# Patient Record
Sex: MALE | Race: WHITE | Employment: FULL TIME | URBAN - METROPOLITAN AREA
[De-identification: names, ages, dates, MRNs, and addresses within clinical notes are randomized per-mention and may not be internally consistent; named-entity substitution may affect disease eponyms.]

---

## 2017-01-20 ENCOUNTER — OFFICE VISIT (OUTPATIENT)
Dept: FAMILY MEDICINE CLINIC | Age: 42
End: 2017-01-20

## 2017-01-20 VITALS
DIASTOLIC BLOOD PRESSURE: 74 MMHG | HEART RATE: 68 BPM | TEMPERATURE: 98 F | RESPIRATION RATE: 16 BRPM | HEIGHT: 75 IN | SYSTOLIC BLOOD PRESSURE: 112 MMHG | WEIGHT: 276 LBS | BODY MASS INDEX: 34.32 KG/M2

## 2017-01-20 DIAGNOSIS — J01.01 ACUTE RECURRENT MAXILLARY SINUSITIS: Primary | ICD-10-CM

## 2017-01-20 DIAGNOSIS — Z80.0 FH: COLON CANCER: ICD-10-CM

## 2017-01-20 PROCEDURE — 99213 OFFICE O/P EST LOW 20 MIN: CPT | Performed by: INTERNAL MEDICINE

## 2017-01-20 RX ORDER — AZITHROMYCIN 250 MG/1
TABLET, FILM COATED ORAL
Qty: 1 PACKET | Refills: 0 | Status: SHIPPED | OUTPATIENT
Start: 2017-01-20 | End: 2017-01-25

## 2017-01-20 ASSESSMENT — ENCOUNTER SYMPTOMS
SHORTNESS OF BREATH: 0
DIARRHEA: 0
WHEEZING: 0
ABDOMINAL PAIN: 0

## 2017-10-20 ENCOUNTER — OFFICE VISIT (OUTPATIENT)
Dept: FAMILY MEDICINE CLINIC | Age: 42
End: 2017-10-20

## 2017-10-20 VITALS
DIASTOLIC BLOOD PRESSURE: 88 MMHG | RESPIRATION RATE: 16 BRPM | SYSTOLIC BLOOD PRESSURE: 138 MMHG | WEIGHT: 279 LBS | HEIGHT: 75 IN | BODY MASS INDEX: 34.69 KG/M2 | TEMPERATURE: 98 F | OXYGEN SATURATION: 98 % | HEART RATE: 64 BPM

## 2017-10-20 DIAGNOSIS — B08.4 HAND, FOOT AND MOUTH DISEASE: Primary | ICD-10-CM

## 2017-10-20 PROCEDURE — 99212 OFFICE O/P EST SF 10 MIN: CPT | Performed by: INTERNAL MEDICINE

## 2017-10-20 RX ORDER — AMOXICILLIN 500 MG/1
CAPSULE ORAL
Refills: 2 | COMMUNITY
Start: 2017-10-17 | End: 2018-06-19 | Stop reason: ALTCHOICE

## 2017-10-20 ASSESSMENT — PATIENT HEALTH QUESTIONNAIRE - PHQ9
2. FEELING DOWN, DEPRESSED OR HOPELESS: 0
SUM OF ALL RESPONSES TO PHQ9 QUESTIONS 1 & 2: 0
1. LITTLE INTEREST OR PLEASURE IN DOING THINGS: 0
SUM OF ALL RESPONSES TO PHQ QUESTIONS 1-9: 0

## 2017-10-20 ASSESSMENT — ENCOUNTER SYMPTOMS
WHEEZING: 0
CONSTIPATION: 0
SHORTNESS OF BREATH: 0
DIARRHEA: 0

## 2017-10-24 ENCOUNTER — TELEPHONE (OUTPATIENT)
Dept: FAMILY MEDICINE CLINIC | Age: 42
End: 2017-10-24

## 2018-06-19 ENCOUNTER — OFFICE VISIT (OUTPATIENT)
Dept: FAMILY MEDICINE CLINIC | Age: 43
End: 2018-06-19

## 2018-06-19 VITALS
RESPIRATION RATE: 14 BRPM | WEIGHT: 283 LBS | BODY MASS INDEX: 35.19 KG/M2 | SYSTOLIC BLOOD PRESSURE: 118 MMHG | HEIGHT: 75 IN | HEART RATE: 88 BPM | DIASTOLIC BLOOD PRESSURE: 80 MMHG | OXYGEN SATURATION: 98 %

## 2018-06-19 DIAGNOSIS — J01.90 ACUTE SINUSITIS, RECURRENCE NOT SPECIFIED, UNSPECIFIED LOCATION: Primary | ICD-10-CM

## 2018-06-19 PROCEDURE — 99213 OFFICE O/P EST LOW 20 MIN: CPT | Performed by: INTERNAL MEDICINE

## 2018-06-19 RX ORDER — AMOXICILLIN AND CLAVULANATE POTASSIUM 875; 125 MG/1; MG/1
1 TABLET, FILM COATED ORAL 2 TIMES DAILY
Qty: 20 TABLET | Refills: 0 | Status: SHIPPED | OUTPATIENT
Start: 2018-06-19 | End: 2018-06-29

## 2018-06-19 RX ORDER — PREDNISONE 10 MG/1
TABLET ORAL
Qty: 32 TABLET | Refills: 0 | Status: SHIPPED | OUTPATIENT
Start: 2018-06-19 | End: 2019-01-28 | Stop reason: ALTCHOICE

## 2018-06-19 ASSESSMENT — ENCOUNTER SYMPTOMS
SHORTNESS OF BREATH: 1
NAUSEA: 0
WHEEZING: 0
COUGH: 1
VOMITING: 0

## 2018-06-19 ASSESSMENT — PATIENT HEALTH QUESTIONNAIRE - PHQ9
SUM OF ALL RESPONSES TO PHQ QUESTIONS 1-9: 0
1. LITTLE INTEREST OR PLEASURE IN DOING THINGS: 0
2. FEELING DOWN, DEPRESSED OR HOPELESS: 0
SUM OF ALL RESPONSES TO PHQ9 QUESTIONS 1 & 2: 0

## 2018-09-24 ENCOUNTER — E-VISIT (OUTPATIENT)
Dept: FAMILY MEDICINE CLINIC | Age: 43
End: 2018-09-24

## 2018-09-24 ENCOUNTER — PATIENT MESSAGE (OUTPATIENT)
Dept: FAMILY MEDICINE CLINIC | Age: 43
End: 2018-09-24

## 2018-09-24 PROCEDURE — 99444 PR PHYSICIAN ONLINE EVALUATION & MANAGEMENT SERVICE: CPT | Performed by: INTERNAL MEDICINE

## 2018-09-24 RX ORDER — AMOXICILLIN AND CLAVULANATE POTASSIUM 875; 125 MG/1; MG/1
1 TABLET, FILM COATED ORAL 2 TIMES DAILY
Qty: 20 TABLET | Refills: 0 | Status: SHIPPED | OUTPATIENT
Start: 2018-09-24 | End: 2018-10-04

## 2018-09-24 ASSESSMENT — LIFESTYLE VARIABLES
HISTORY_OF_SMOKING: I SMOKED IN THE PAST, BUT NOT REGULARY
SMOKING_STATUS: SOMETIMES

## 2018-09-24 NOTE — TELEPHONE ENCOUNTER
From: Levi Butler  To: Danny Cole MD  Sent: 9/24/2018 12:47 PM EDT  Subject: Prescription Question    I am writing to see if you could call me in a presciption for an antibiotic for a Sinus Infection. I was in to see you a couple months ago for same thing. I have having bad sinus pressure and a neon greenish yellowish mucus. When I called they said you would want me to come in, but I will be traveling in and out of town for the next few days, will only be in town in the evenings. I was hoping since you saw me not that long ago you would be able to call in a prescription for me. I get extremely stuffed at night and have some sinus pressure on the left side of my face. No fever or coughing, no other symptoms. Would like to knock this out before gets worse.     Please let me know, call me if necessary [phone]    Thank you,    Chip

## 2019-01-28 ENCOUNTER — OFFICE VISIT (OUTPATIENT)
Dept: FAMILY MEDICINE CLINIC | Age: 44
End: 2019-01-28
Payer: COMMERCIAL

## 2019-01-28 VITALS
DIASTOLIC BLOOD PRESSURE: 84 MMHG | BODY MASS INDEX: 36.18 KG/M2 | WEIGHT: 291 LBS | HEART RATE: 70 BPM | SYSTOLIC BLOOD PRESSURE: 134 MMHG | OXYGEN SATURATION: 98 % | HEIGHT: 75 IN | RESPIRATION RATE: 14 BRPM | TEMPERATURE: 98.1 F

## 2019-01-28 DIAGNOSIS — J01.91 ACUTE RECURRENT SINUSITIS, UNSPECIFIED LOCATION: Primary | ICD-10-CM

## 2019-01-28 DIAGNOSIS — Z12.11 SCREENING FOR COLON CANCER: ICD-10-CM

## 2019-01-28 PROCEDURE — 99213 OFFICE O/P EST LOW 20 MIN: CPT | Performed by: INTERNAL MEDICINE

## 2019-01-28 RX ORDER — AMOXICILLIN AND CLAVULANATE POTASSIUM 875; 125 MG/1; MG/1
1 TABLET, FILM COATED ORAL 2 TIMES DAILY
Qty: 20 TABLET | Refills: 0 | Status: SHIPPED | OUTPATIENT
Start: 2019-01-28 | End: 2019-02-07

## 2019-01-28 RX ORDER — PREDNISONE 10 MG/1
TABLET ORAL
Qty: 32 TABLET | Refills: 0 | Status: SHIPPED | OUTPATIENT
Start: 2019-01-28 | End: 2019-07-31

## 2019-01-28 ASSESSMENT — ENCOUNTER SYMPTOMS
NAUSEA: 0
COUGH: 1
VOMITING: 0

## 2019-01-30 NOTE — PROGRESS NOTES
10/20/2017    This is a 38 y.o. male   Chief Complaint   Patient presents with    Pharyngitis   . 2 days ago felt like something was stuck in the throat. Has white sore on the tonsil  Reminded him of when he had hand , foot , mouth dx. Maybe  1 -1 1/2 yrs    Son just had it a week ago. No rashes . Only has one lesion in the mouth  No trouble swollowing   No fever    HPI    Review of Systems   Constitutional: Negative for appetite change and chills. Respiratory: Negative for shortness of breath and wheezing. Gastrointestinal: Negative for constipation and diarrhea. Musculoskeletal: Positive for arthralgias (chronic hip  both ) and myalgias. Past Medical History:   Diagnosis Date    Avascular necrosis ([address]     BOTH HIPS    DJD (degenerative joint disease)     Fracture of ulna, left, closed     LEFT 1992 AND RIGHT ULNAR 1995    Fracture, tibia 2004    Sinus headache        Prior to Visit Medications    Medication Sig Taking?  Authorizing Provider   amoxicillin (AMOXIL) 500 MG capsule DNC  Historical Provider, MD   albuterol sulfate  (90 BASE) MCG/ACT inhaler Inhale 2 puffs into the lungs every 4 hours as needed for Wheezing May substitute for insurance preferred (Ventolin, Proventil, ProAir)  Greta Bingham MD       Past Surgical History:   Procedure Laterality Date    OTHER SURGICAL HISTORY      L HIP ASEPTIC NECROSIS    SINUS SURGERY  06/29/2016    FESS    TOTAL HIP ARTHROPLASTY  06/2009    RIGHT     TOTAL HIP ARTHROPLASTY Left     WISDOM TOOTH EXTRACTION         Social History     Social History    Marital status:      Spouse name: Teena De Jesus Number of children: 1    Years of education: N/A     Occupational History    [employer] for building material       [employer]     Social History Main Topics    Smoking status: Former Smoker     Years: 16.00     Types: Cigarettes    Smokeless tobacco: Never Used      Comment: counseled on tobacco exposure avoidance  smokes 1/2 cigarette day    Alcohol use 0.0 oz/week      Comment: socially    Drug use: No    Sexual activity: Not on file     Other Topics Concern    Not on file     Social History Narrative    No narrative on file       Family History   Problem Relation Age of Onset    High Blood Pressure Mother     Cancer Father 59     COLON THAT MEASTASIZED TO LIVER/ LUNG        Current Outpatient Prescriptions   Medication Sig Dispense Refill    amoxicillin (AMOXIL) 500 MG capsule DNC  2    albuterol sulfate  (90 BASE) MCG/ACT inhaler Inhale 2 puffs into the lungs every 4 hours as needed for Wheezing May substitute for insurance preferred (Ventolin, Proventil, ProAir) 1 Inhaler 0     No current facility-administered medications for this visit. No Known Allergies    /88 (Site: Left Arm, Position: Sitting, Cuff Size: Large Adult)   Pulse 64   Temp 98 °F (36.7 °C) (Oral)   Resp 16   Ht 6' 3\" (1.905 m)   Wt 279 lb (126.6 kg)   SpO2 98%   BMI 34.87 kg/m²     Physical Exam   Constitutional: He appears well-developed and well-nourished. HENT:   Head: Normocephalic and atraumatic. Right Ear: External ear normal.   Left Ear: External ear normal.   Has a 5mm ulceration on the right tonsils  No other mouth lesions   Eyes: Conjunctivae are normal. No scleral icterus. Neck: Neck supple. No thyromegaly present. Cardiovascular: Normal rate and regular rhythm. No murmur heard. Pulmonary/Chest: Breath sounds normal. No respiratory distress. He has no wheezes. Abdominal: Soft. He exhibits no distension. There is no tenderness. Musculoskeletal: He exhibits no edema or deformity. Neurological: He is alert. He exhibits normal muscle tone. Skin: Skin is warm and dry. No rash noted. Psychiatric: He has a normal mood and affect.  His behavior is normal. Judgment and thought content normal.       Wt Readings from Last 3 Encounters:   10/20/17 279 lb (126.6 kg)   01/20/17 276 lb (125.2 kg)   12/06/16 [phone]

## 2019-07-03 ENCOUNTER — OFFICE VISIT (OUTPATIENT)
Dept: FAMILY MEDICINE CLINIC | Age: 44
End: 2019-07-03
Payer: COMMERCIAL

## 2019-07-03 VITALS
SYSTOLIC BLOOD PRESSURE: 128 MMHG | WEIGHT: 280 LBS | BODY MASS INDEX: 34.82 KG/M2 | HEIGHT: 75 IN | OXYGEN SATURATION: 98 % | HEART RATE: 95 BPM | DIASTOLIC BLOOD PRESSURE: 88 MMHG

## 2019-07-03 DIAGNOSIS — Z12.83 SCREENING EXAM FOR SKIN CANCER: ICD-10-CM

## 2019-07-03 DIAGNOSIS — L30.1 DYSHIDROTIC ECZEMA: Primary | ICD-10-CM

## 2019-07-03 DIAGNOSIS — R03.0 ELEVATED BP WITHOUT DIAGNOSIS OF HYPERTENSION: ICD-10-CM

## 2019-07-03 PROCEDURE — 99213 OFFICE O/P EST LOW 20 MIN: CPT | Performed by: INTERNAL MEDICINE

## 2019-07-03 RX ORDER — PREDNISONE 10 MG/1
TABLET ORAL
Qty: 49 TABLET | Refills: 0 | Status: SHIPPED | OUTPATIENT
Start: 2019-07-03 | End: 2019-07-31

## 2019-07-03 ASSESSMENT — PATIENT HEALTH QUESTIONNAIRE - PHQ9
2. FEELING DOWN, DEPRESSED OR HOPELESS: 0
SUM OF ALL RESPONSES TO PHQ QUESTIONS 1-9: 0
1. LITTLE INTEREST OR PLEASURE IN DOING THINGS: 0
SUM OF ALL RESPONSES TO PHQ QUESTIONS 1-9: 0
SUM OF ALL RESPONSES TO PHQ9 QUESTIONS 1 & 2: 0

## 2019-07-03 ASSESSMENT — ENCOUNTER SYMPTOMS: ROS SKIN COMMENTS: ITCHY

## 2019-07-31 ENCOUNTER — OFFICE VISIT (OUTPATIENT)
Dept: FAMILY MEDICINE CLINIC | Age: 44
End: 2019-07-31
Payer: COMMERCIAL

## 2019-07-31 VITALS
DIASTOLIC BLOOD PRESSURE: 80 MMHG | RESPIRATION RATE: 15 BRPM | WEIGHT: 281 LBS | BODY MASS INDEX: 34.94 KG/M2 | TEMPERATURE: 97.7 F | HEIGHT: 75 IN | HEART RATE: 66 BPM | SYSTOLIC BLOOD PRESSURE: 128 MMHG

## 2019-07-31 DIAGNOSIS — Z12.11 SCREENING FOR COLON CANCER: ICD-10-CM

## 2019-07-31 DIAGNOSIS — Z80.0 FH: COLON CANCER: ICD-10-CM

## 2019-07-31 DIAGNOSIS — L30.1 DYSHIDROTIC ECZEMA: Primary | ICD-10-CM

## 2019-07-31 DIAGNOSIS — Z00.00 LABORATORY TESTS ORDERED AS PART OF A COMPLETE PHYSICAL EXAM (CPE): ICD-10-CM

## 2019-07-31 PROCEDURE — 99213 OFFICE O/P EST LOW 20 MIN: CPT | Performed by: INTERNAL MEDICINE

## 2019-07-31 ASSESSMENT — ENCOUNTER SYMPTOMS
ROS SKIN COMMENTS: NO ITCHING
EYE REDNESS: 0
DIARRHEA: 0
CONSTIPATION: 0
COLOR CHANGE: 0
BLOOD IN STOOL: 0
EYE ITCHING: 0

## 2019-07-31 NOTE — PROGRESS NOTES
7/31/2019    Chief Complaint   Patient presents with    Other     Pt is here for a 3 wk f/u      Saw dermatology and is being treated for dishydrodic eczema with cream now  Finished prednisone  Hand rash resolved  Feet are getting better  Saw derm last week     Needs colonoscopy . Father had colon cancer    Not sure when diagnosed. HPI    Review of Systems   Constitutional: Negative for appetite change and unexpected weight change. Eyes: Negative for redness and itching. Gastrointestinal: Negative for blood in stool, constipation and diarrhea. Skin: Negative for color change. 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      Comment: [employer]   Social Needs    Financial resource strain: None    Food insecurity:     Worry: None     Inability: None    Transportation needs:     Medical: None     Non-medical: None   Tobacco Use    Smoking status: Former Smoker     Years: 16.00     Types: Cigarettes    Smokeless tobacco: Never Used    Tobacco comment: counseled on tobacco exposure avoidance  smokes 1/2 cigarette day   Substance and Sexual Activity    Alcohol use:  Yes     Alcohol/week: 0.0 standard drinks     Comment: socially    Drug use: No    Sexual activity: None   Lifestyle    Physical activity:     Days per week: None     Minutes per session: None    Stress: None   Relationships    Social connections:     Talks on phone: None     Gets together: None     Attends [employer] service: None     Active member

## 2020-11-21 ENCOUNTER — NURSE TRIAGE (OUTPATIENT)
Dept: OTHER | Facility: CLINIC | Age: 45
End: 2020-11-21

## 2020-11-21 NOTE — TELEPHONE ENCOUNTER
Reason for Disposition   [1] COVID-19 infection suspected by caller or triager AND [2] mild symptoms (cough, fever, or others) AND [2] no complications or SOB    Answer Assessment - Initial Assessment Questions  1. COVID-19 DIAGNOSIS: \"Who made your Coronavirus (COVID-19) diagnosis? \" \"Was it confirmed by a positive lab test?\" If not diagnosed by a HCP, ask \"Are there lots of cases (community spread) where you live? \" (See [facility] health department website, if unsure)      Na    2. ONSET: \"When did the COVID-19 symptoms start? \"       Cough started on wed. Loss of smell this morning     3. WORST SYMPTOM: \"What is your worst symptom? \" (e.g., cough, fever, shortness of breath, muscle aches)      Loss of smell     4. COUGH: \"Do you have a cough? \" If so, ask: \"How bad is the cough? \"        Cough comes and goes in morning mostly taking mucinex to help does get worse in evening and takes OTC meds     5. FEVER: \"Do you have a fever? \" If so, ask: \"What is your temperature, how was it measured, and when did it start? \"      No     6. RESPIRATORY STATUS: \"Describe your breathing? \" (e.g., shortness of breath, wheezing, unable to speak)       No     7. BETTER-SAME-WORSE: Merla Soho you getting better, staying the same or getting worse compared to yesterday? \"  If getting worse, ask, \"In what way? \"      Worse    8. HIGH RISK DISEASE: \"Do you have any chronic medical problems? \" (e.g., asthma, heart or lung disease, weak immune system, etc.)      No     9. PREGNANCY: \"Is there any chance you are pregnant? \" \"When was your last menstrual period? \"      Na    10. OTHER SYMPTOMS: \"Do you have any other symptoms? \"  (e.g., chills, fatigue, headache, loss of smell or taste, muscle pain, sore throat)        Headache possibly sinus    Protocols used: CORONAVIRUS (COVID-19) DIAGNOSED OR SUSPECTED-ADULT-    Patient called pre-service [facility] with red flag complaint.      Brief description of triage: covid / flu like symptoms

## 2020-11-23 NOTE — TELEPHONE ENCOUNTER
[phone] (home)   Called pt and he did get tested at an urgent care. Has not gotten results back. Pt says that he is still unable to smell. Has a slight cough. But doesn't feel too bad. Will let us know how the results come back.

## 2020-12-01 ENCOUNTER — VIRTUAL VISIT (OUTPATIENT)
Dept: FAMILY MEDICINE CLINIC | Age: 45
End: 2020-12-01
Payer: COMMERCIAL

## 2020-12-01 PROCEDURE — 99213 OFFICE O/P EST LOW 20 MIN: CPT | Performed by: NURSE PRACTITIONER

## 2020-12-01 RX ORDER — AMOXICILLIN AND CLAVULANATE POTASSIUM 875; 125 MG/1; MG/1
1 TABLET, FILM COATED ORAL 2 TIMES DAILY
Qty: 20 TABLET | Refills: 0 | Status: SHIPPED | OUTPATIENT
Start: 2020-12-01 | End: 2020-12-11

## 2020-12-01 ASSESSMENT — PATIENT HEALTH QUESTIONNAIRE - PHQ9
2. FEELING DOWN, DEPRESSED OR HOPELESS: 0
SUM OF ALL RESPONSES TO PHQ9 QUESTIONS 1 & 2: 0
SUM OF ALL RESPONSES TO PHQ QUESTIONS 1-9: 0
1. LITTLE INTEREST OR PLEASURE IN DOING THINGS: 0

## 2020-12-01 ASSESSMENT — ENCOUNTER SYMPTOMS
RHINORRHEA: 1
ABDOMINAL PAIN: 0
COUGH: 1
SINUS PAIN: 1
SORE THROAT: 0
SHORTNESS OF BREATH: 1
SINUS PRESSURE: 1
WHEEZING: 0

## 2020-12-01 NOTE — PROGRESS NOTES
2020    TELEHEALTH EVALUATION -- Audio/Visual (During TYQYX-02 public health emergency)    HPI:    Kuldip Fowler (:  1975) has requested an audio/video evaluation for the following concern(s):  Chief Complaint   Patient presents with    Cough     loss of smell and tested positive for covid  and results come back on .  Congestion     Started with cough on 20. Went to Urgent care on 20 and had COVID positive testing. Reports that he continues with a mild cough mostly in the AM and PM. Denies shortness of breath. Sinus drainage is thick yellow and green. Positive for sinus pressure and congestion. Feels that sinus symptoms are getting worse. Been using OTC sinus nasal spray without improvement in symptoms. Denies fever. Did lose sense of smell, but that has now returned. Has history of sinusitis. Review of Systems   Constitutional: Positive for activity change and fatigue. Negative for fever. HENT: Positive for congestion, postnasal drip, rhinorrhea, sinus pressure and sinus pain. Negative for sore throat. Respiratory: Positive for cough and shortness of breath. Negative for wheezing. Cardiovascular: Negative for chest pain, palpitations and leg swelling. Gastrointestinal: Negative for abdominal pain. Neurological: Negative for dizziness and headaches. Prior to Visit Medications    Not on File       Social History     Tobacco Use    Smoking status: Former Smoker     Years: 16.00     Types: Cigarettes    Smokeless tobacco: Never Used    Tobacco comment: counseled on tobacco exposure avoidance  smokes 1/2 cigarette day   Substance Use Topics    Alcohol use:  Yes     Alcohol/week: 0.0 standard drinks     Comment: socially    Drug use: No        PHYSICAL EXAMINATION:  [ INSTRUCTIONS:  \"[x]\" Indicates a positive item  \"[]\" Indicates a negative item  -- DELETE ALL ITEMS NOT EXAMINED]  Patient-Reported Vitals 2020   Patient-Reported Weight 275lb   Patient-Reported Height 6'4\"          Constitutional: [x] Appears well-developed and well-nourished [x] No apparent distress      [] Abnormal-   Mental status  [x] Alert and awake  [x] Oriented to person/place/time [x]Able to follow commands      Eyes:  EOM    [x]  Normal  [] Abnormal-  Sclera  [x]  Normal  [] Abnormal -         Discharge [x]  None visible  [] Abnormal -    HENT:   [x] Normocephalic, atraumatic. [] Abnormal   [x] Mouth/Throat: Mucous membranes are moist.     External Ears [x] Normal  [] Abnormal-     Neck: [x] No visualized mass     Pulmonary/Chest: [x] Respiratory effort normal.  [x] No visualized signs of difficulty breathing or respiratory distress        [] Abnormal-      Musculoskeletal:   [] Normal gait with no signs of ataxia         [x] Normal range of motion of neck        [] Abnormal-       Neurological:        [x] No Facial Asymmetry (Cranial nerve 7 motor function) (limited exam to video visit)          [x] No gaze palsy        [] Abnormal-         Skin:        [x] No significant exanthematous lesions or discoloration noted on facial skin         [] Abnormal-            Psychiatric:       [x] Normal Affect [x] No Hallucinations        [] Abnormal-     Other pertinent observable physical exam findings-     ASSESSMENT/PLAN:  1. COVID-19 virus infection  Symptoms developed 12/17/20. Now feeling better. He has been working from home and planning to return back to in person work next week. 2. Acute bacterial sinusitis  - amoxicillin-clavulanate (AUGMENTIN) 875-125 MG per tablet; Take 1 tablet by mouth 2 times daily for 10 days For treatment of bacterial infection. Take with meals. Dispense: 20 tablet; Refill: 0  Should take an over-the-counter probiotic daily while on the antibiotic to help prevent antibiotic associated diarrhea. Continue to stay hydrated with water. Normal saline spray PRN  Humidifier qhs.    Patient is to call if symptoms worsen or fail to improve within the week.     Return if symptoms worsen or fail to improve. Karen Ramos is a 39 y.o. male being evaluated by a Virtual Visit (video visit) encounter to address concerns as mentioned above. A caregiver was present when appropriate. Due to this being a TeleHealth encounter (During YQLEB-71 public health emergency), evaluation of the following organ systems was limited: Vitals/Constitutional/EENT/Resp/CV/GI//MS/Neuro/Skin/Heme-Lymph-Imm. Pursuant to the emergency declaration under the [address] [place] [address], [address] [place] [address] authority and the [employer] Act, this Virtual Visit was conducted with patient's (and/or legal guardian's) consent, to reduce the patient's risk of exposure to COVID-19 and provide necessary medical care. The patient (and/or legal guardian) has also been advised to contact this office for worsening conditions or problems, and seek emergency medical treatment and/or call 911 if deemed necessary. Patient identification was verified at the start of the visit: Yes    Total time spent on this encounter: Not billed by time    Services were provided through a video synchronous discussion virtually to substitute for in-person clinic visit. Patient and provider were located at their individual homes. --WHITNEY Genao CNP on 12/1/2020 at 9:00 AM    An electronic signature was used to authenticate this note.

## 2021-12-07 ENCOUNTER — E-VISIT (OUTPATIENT)
Dept: FAMILY MEDICINE CLINIC | Age: 46
End: 2021-12-07
Payer: COMMERCIAL

## 2021-12-07 DIAGNOSIS — B96.89 ACUTE BACTERIAL SINUSITIS: Primary | ICD-10-CM

## 2021-12-07 DIAGNOSIS — J01.90 ACUTE BACTERIAL SINUSITIS: Primary | ICD-10-CM

## 2021-12-07 PROCEDURE — 99421 OL DIG E/M SVC 5-10 MIN: CPT | Performed by: NURSE PRACTITIONER

## 2021-12-07 RX ORDER — AMOXICILLIN AND CLAVULANATE POTASSIUM 875; 125 MG/1; MG/1
1 TABLET, FILM COATED ORAL 2 TIMES DAILY
Qty: 20 TABLET | Refills: 0 | Status: SHIPPED | OUTPATIENT
Start: 2021-12-07 | End: 2021-12-17

## 2021-12-07 ASSESSMENT — LIFESTYLE VARIABLES
SMOKING_STATUS: YES
SMOKING_YEARS: 10

## 2021-12-07 NOTE — PROGRESS NOTES
Reviewed patient's responses. Will treat for sinusitis. See message to patient. 5-10 minutes were spent on the digital evaluation and management of this patient.

## 2022-01-04 ENCOUNTER — E-VISIT (OUTPATIENT)
Dept: FAMILY MEDICINE CLINIC | Age: 47
End: 2022-01-04
Payer: COMMERCIAL

## 2022-01-04 DIAGNOSIS — R09.81 SINUS CONGESTION: Primary | ICD-10-CM

## 2022-01-04 DIAGNOSIS — R51.9 ACUTE NONINTRACTABLE HEADACHE, UNSPECIFIED HEADACHE TYPE: ICD-10-CM

## 2022-01-04 PROCEDURE — 99421 OL DIG E/M SVC 5-10 MIN: CPT | Performed by: NURSE PRACTITIONER

## 2022-01-04 NOTE — PROGRESS NOTES
Reviewed patients responses. Will treat for sinus congestion. See message to patient. 5-10 minutes were spent on the digital evaluation and management of this patient.

## 2022-01-06 ENCOUNTER — TELEMEDICINE (OUTPATIENT)
Dept: FAMILY MEDICINE CLINIC | Age: 47
End: 2022-01-06
Payer: COMMERCIAL

## 2022-01-06 ENCOUNTER — TELEPHONE (OUTPATIENT)
Dept: FAMILY MEDICINE CLINIC | Age: 47
End: 2022-01-06

## 2022-01-06 DIAGNOSIS — B96.89 ACUTE BACTERIAL SINUSITIS: ICD-10-CM

## 2022-01-06 DIAGNOSIS — J01.90 ACUTE BACTERIAL SINUSITIS: ICD-10-CM

## 2022-01-06 DIAGNOSIS — R51.9 ACUTE NONINTRACTABLE HEADACHE, UNSPECIFIED HEADACHE TYPE: Primary | ICD-10-CM

## 2022-01-06 PROCEDURE — 99442 PR PHYS/QHP TELEPHONE EVALUATION 11-20 MIN: CPT | Performed by: FAMILY MEDICINE

## 2022-01-06 RX ORDER — CLARITHROMYCIN 500 MG/1
500 TABLET, COATED ORAL 2 TIMES DAILY
Qty: 20 TABLET | Refills: 0 | Status: SHIPPED | OUTPATIENT
Start: 2022-01-06 | End: 2022-01-16

## 2022-01-06 RX ORDER — PREDNISONE 10 MG/1
10 TABLET ORAL 2 TIMES DAILY
Qty: 10 TABLET | Refills: 0 | Status: SHIPPED | OUTPATIENT
Start: 2022-01-06 | End: 2022-01-11

## 2022-01-06 NOTE — PROGRESS NOTES
TELEHEALTH EVALUATION -- Audio/Visual (During [facility]-06 public health emergency)  VIDEO VISIT- patient and provider not at same location  Also present:none. PROBLEM VISIT NOTE     Subjective:     Chief Complaint   Patient presents with    Headache     Katelyn Martinez is a 42 y.o. male   who presents really bad headache starts behind his left eye in his upper jaw and temple. X 10 days, only one day was pain free. Takes Excedrin migraine and a decongestant like sudafed or mucinex. Happens every day in the afternoon goes to bed and it is gone in the mornnig. Its more of a sharp stabbing pain. He has some light sensitivity. No nausea. Chewing OK. No burn or tingle. Lasts 45-60 min. Duration this has been going on 1.5 weeks. History of sinus surgery. Patient's medications, allergies, past medical, and social histories were reviewed and updated as appropriate (see below). Review of Systems   General ROS: fever? No,    Night sweats? No  Endocrine ROS:malaise/lethargy? No   Unexpected weight changes? No  Respiratory ROS: cough? No   Shortness of breath? No  Cardiovascular ROS:chest pain? No   Shortness of breath with exertion? No  Gastrointestinal ROS: abdominal pain? No   Change in stools? No  Genito-Urinary ROS: painful urination? No   Trouble voiding? No  Neurological ROS: TIA or stroke symptoms? No   Numbness/tingling in feet? No    Health Maintenance Due   Topic Date Due    Hepatitis C screen  Never done    Depression Screen  Never done    HIV screen  Never done    Lipid screen  Never done    DTaP/Tdap/Td vaccine (2 - Td or Tdap) 05/18/2019    Flu vaccine (1) 09/01/2021    COVID-19 Vaccine (3 - Booster for Pfizer series) 10/09/2021       Documentation provided by medical assistant reviewed and updated by provider. HISTORY:  Patient's medications, allergies, past medical, and social histories were reviewed and updated as appropriate.      CHART REVIEW  Health Maintenance   Topic Date Due  Hepatitis C screen  Never done    Depression Screen  Never done    HIV screen  Never done    Lipid screen  Never done    DTaP/Tdap/Td vaccine (2 - Td or Tdap) 05/18/2019    Flu vaccine (1) 09/01/2021    COVID-19 Vaccine (3 - Booster for Pfizer series) 10/09/2021    Hepatitis A vaccine  Aged Out    Hepatitis B vaccine  Aged Out    Hib vaccine  Aged Out    Meningococcal (ACWY) vaccine  Aged Out    Pneumococcal 0-64 years Vaccine  Aged Out     The ASCVD Risk score (Deyanira Johnston, et al., 2013) failed to calculate for the following reasons: The systolic blood pressure is missing    Cannot find a previous HDL lab    Cannot find a previous total cholesterol lab  Prior to Visit Medications    Medication Sig Taking? Authorizing Provider   clarithromycin (BIAXIN) 500 MG tablet Take 1 tablet by mouth 2 times daily for 10 days Yes Mallory Winter MD   predniSONE (DELTASONE) 10 MG tablet Take 1 tablet by mouth 2 times daily for 5 days Yes Mallory Winter MD      Family History   Problem Relation Age of Onset    High Blood Pressure Mother     Cancer Father 59        COLON THAT MEASTASIZED TO LIVER/ LUNG      Social History     Tobacco Use    Smoking status: Former Smoker     Years: 16.00     Types: Cigarettes    Smokeless tobacco: Never Used    Tobacco comment: counseled on tobacco exposure avoidance  smokes 1/2 cigarette day   Substance Use Topics    Alcohol use:  Yes     Alcohol/week: 0.0 standard drinks     Comment: socially    Drug use: No      LAST LABS  No results found for: CHOL, LDL, LDLCALCNo results found for: HDLNo results found for: TRIG  Lab Results   Component Value Date    GLUCOSE 85 02/14/2012     Lab Results   Component Value Date     02/14/2012    K 4.5 02/14/2012    CREATININE 0.8 (L) 02/14/2012     Lab Results   Component Value Date    WBC 6.1 02/14/2012    HGB 16.2 02/14/2012    HCT 46.5 02/14/2012    MCV 87.9 02/14/2012     02/14/2012     Lab Results   Component Value Date ALT 65 (H) 02/14/2012    AST 37 02/14/2012    ALKPHOS 73 02/14/2012    BILITOT 0.50 02/14/2012     TSH (uIU/ml)   Date Value   02/14/2012 0.83     No results found for: LABA1C   Assessment and Plan:      Diagnosis Orders   1. Acute nonintractable headache, unspecified headache type  predniSONE (DELTASONE) 10 MG tablet   2. Acute bacterial sinusitis  clarithromycin (BIAXIN) 500 MG tablet     INSTRUCTIONS  · Please finish taking ALL of the antibiotics and prednisone  · Let us know next week if not better. My need x-ray sinuses. I affirm this is a Patient Initiated Episode with an Established Patient who has not had a related appointment within my department in the past 7 days or scheduled within the next 24 hours.     Total Time: minutes: 11-20 minutes    Note: not billable if this call serves to triage the patient into an appointment for the relevant concern    Alicia Aden MD

## 2022-01-06 NOTE — TELEPHONE ENCOUNTER
Patient has only completed evisits. His symptoms have been ongoing. If he would like to have a COVID-19 test, can order. If his symptoms are worsening, then he needs an evaluation. Could schedule virtual with Dr. Lie Oviedo today if available.

## 2022-01-06 NOTE — TELEPHONE ENCOUNTER
Patient called in stating that he had a VV on 1/4/2022 with Josue Colin about his headache & sinus issues. He is stating that his headache was really bad last night and wanted to know if there is anything else he can try. He has been vaccinated. Should he get tested for COVID?     Please Advise

## 2022-01-14 ENCOUNTER — VIRTUAL VISIT (OUTPATIENT)
Dept: FAMILY MEDICINE CLINIC | Age: 47
End: 2022-01-14
Payer: COMMERCIAL

## 2022-01-14 ENCOUNTER — TELEPHONE (OUTPATIENT)
Dept: FAMILY MEDICINE CLINIC | Age: 47
End: 2022-01-14

## 2022-01-14 DIAGNOSIS — G44.019 EPISODIC CLUSTER HEADACHE, NOT INTRACTABLE: Primary | ICD-10-CM

## 2022-01-14 PROCEDURE — 99213 OFFICE O/P EST LOW 20 MIN: CPT | Performed by: FAMILY MEDICINE

## 2022-01-14 RX ORDER — PREDNISONE 10 MG/1
TABLET ORAL
Qty: 21 TABLET | Refills: 0 | Status: SHIPPED | OUTPATIENT
Start: 2022-01-14 | End: 2022-01-26

## 2022-01-14 ASSESSMENT — PATIENT HEALTH QUESTIONNAIRE - PHQ9
1. LITTLE INTEREST OR PLEASURE IN DOING THINGS: 1
SUM OF ALL RESPONSES TO PHQ QUESTIONS 1-9: 2
SUM OF ALL RESPONSES TO PHQ QUESTIONS 1-9: 2
2. FEELING DOWN, DEPRESSED OR HOPELESS: 1
SUM OF ALL RESPONSES TO PHQ9 QUESTIONS 1 & 2: 2
SUM OF ALL RESPONSES TO PHQ QUESTIONS 1-9: 2
SUM OF ALL RESPONSES TO PHQ QUESTIONS 1-9: 2

## 2022-01-14 NOTE — PROGRESS NOTES
TELEHEALTH EVALUATION -- Audio/Visual (During [facility]-77 public health emergency)  VIDEO VISIT- patient and provider not at same location  Also present:none. Chief Complaint   Patient presents with    Sinusitis     pt is having sinus headaches, and sinus pressure. he is having post nasal drip that produces phlem it is all clear when he blows his nose. pt is done with prednisone that was given last week still has a couple days left of antibiotics      Headache returned 3 days ago. Awakening him from sleep. VV on 1/6 Tx with prednisone and antibiotic and they resolved within 2 days. Came back on 1/11-12 overnight. Has awakened him from sleep last few nights. Pressure upper left jaw. Pressure left cheek and behind eye. Sharp. Lasts 1 h with taking excedrin. Some photophobia. No nausea. Associated eyelid swelling and nasal conjestion. HISTORY:  Patient's medications, allergies, past medical, and social histories were reviewed and updated as appropriate. CHART REVIEW  Health Maintenance   Topic Date Due    Hepatitis C screen  Never done    Depression Screen  Never done    HIV screen  Never done    Lipid screen  Never done    DTaP/Tdap/Td vaccine (2 - Td or Tdap) 05/18/2019    Flu vaccine (1) 09/01/2021    COVID-19 Vaccine (3 - Booster for Pfizer series) 10/09/2021    Hepatitis A vaccine  Aged Out    Hepatitis B vaccine  Aged Out    Hib vaccine  Aged Out    Meningococcal (ACWY) vaccine  Aged Out    Pneumococcal 0-64 years Vaccine  Aged Out     The ASCVD Risk score (Mónica Miller., et al., 2013) failed to calculate for the following reasons: The systolic blood pressure is missing    Cannot find a previous HDL lab    Cannot find a previous total cholesterol lab  Prior to Visit Medications    Medication Sig Taking?  Authorizing Provider   verapamil (CALAN SR) 120 MG extended release tablet Take 1 tablet by mouth 2 times daily Yes Rhianna Ríos MD   predniSONE (DELTASONE) 10 MG tablet Take 2 twice a day for 3 days, the 1 twice a day for 3 days, then 1 daily for 3 days, then STOP. Yes Christine Walker MD   clarithromycin (BIAXIN) 500 MG tablet Take 1 tablet by mouth 2 times daily for 10 days Yes Christine Walker MD      Family History   Problem Relation Age of Onset    High Blood Pressure Mother     Cancer Father 59        COLON THAT MEASTASIZED TO LIVER/ LUNG      Social History     Tobacco Use    Smoking status: Former Smoker     Years: 16.00     Types: Cigarettes    Smokeless tobacco: Never Used    Tobacco comment: counseled on tobacco exposure avoidance  smokes 1/2 cigarette day   Substance Use Topics    Alcohol use: Yes     Alcohol/week: 0.0 standard drinks     Comment: socially    Drug use: No      LAST LABS  No results found for: CHOL, LDL, LDLCALCNo results found for: HDLNo results found for: TRIG  Lab Results   Component Value Date    GLUCOSE 85 02/14/2012     Lab Results   Component Value Date     02/14/2012    K 4.5 02/14/2012    CREATININE 0.8 (L) 02/14/2012     Lab Results   Component Value Date    WBC 6.1 02/14/2012    HGB 16.2 02/14/2012    HCT 46.5 02/14/2012    MCV 87.9 02/14/2012     02/14/2012     Lab Results   Component Value Date    ALT 65 (H) 02/14/2012    AST 37 02/14/2012    ALKPHOS 73 02/14/2012    BILITOT 0.50 02/14/2012     TSH (uIU/ml)   Date Value   02/14/2012 0.83     No results found for: LABA1C   Objective:   PHYSICAL EXAM:  Vitals (if available)    BP Readings from Last 3 Encounters:   07/31/19 128/80   07/03/19 128/88   01/28/19 134/84     Wt Readings from Last 3 Encounters:   07/31/19 281 lb (127.5 kg)   07/03/19 280 lb (127 kg)   01/28/19 291 lb (132 kg)     GENERAL:   · well-developed, well-nourished, alert, no distress. EYES:   · External findings: lids and lashes normal and conjunctivae and sclerae normal  · Eyes: no periorbital cellulitis.   HENT:   · Normocephalic, atraumatic  · External nose and ears appear normal  · Mucous membranes are moist  · Hearing grossly normal.     NECK: No visible masses  LUNGS:    · Respiratory effort normal.  · No visualized signs of difficulty breathing or respiratory distress  SKIN: warm and dry  · No significant exanthematous lesions or discoloration noted on facial skin  PSYCH:    · Alert and oriented, able to follow commands  · Normal reasoning, insight good  · Normal affect  · No memory disturbance noted  NEURO:   No Facial Asymmetry (Cranial nerve 7 motor function) (limited exam to video visit)      No gaze palsy      Assessment and Plan:      Diagnosis Orders   1. Episodic cluster headache, not intractable  verapamil (CALAN SR) 120 MG extended release tablet    predniSONE (DELTASONE) 10 MG tablet   Plan below. INSTRUCTIONS  · NEXT APPOINTMENT: See PCP in 3 weeks. · Presumed cluster headache  · Finish last couple days antibiotics  · Excedrin as needed  · To ER is intractable for oxygen. Virtual Visit (video visit) encounter employed to address concerns as mentioned above. A caregiver was present when appropriate. Due to this being a TeleHealth encounter (During [facility] public health emergency), evaluation of the following organ systems was limited. Pursuant to the emergency declaration under the [address] [place] [address] authority and the [employer] Act, this Virtual Visit was conducted with patient's (and/or legal guardian's) consent, to reduce the patient's risk of exposure to COVID-19 and provide necessary medical care. The patient (and/or legal guardian) has also been advised to contact this office for worsening conditions or problems, and seek emergency medical treatment and/or call 911 if deemed necessary. Services were provided through a video synchronous discussion virtually to substitute for in-person clinic visit. Patient and provider were located at their individual homes.     minutes: 11-20 minutes were spent on the digital evaluation and management of this patient. --Melodie Hinton MD on 1/14/2022 at 2:09 PM    An electronic signature was used to authenticate this note.

## 2022-01-14 NOTE — TELEPHONE ENCOUNTER
Called pt    Stated that he would like to follow up from vv. Stated that he was given antibiotics and steroids that were working. Stated that the sinus pressure went away and is back again. Stated that the last two days his headaches are back and starting to get worse. Stated that he is okay with a vv if needed but would like to follow up. Stated that he would like to follow up with Dr. Kim Westfall if possible. Stated that he still has 2 or 3 days of the antibiotic but the steroid is completed.      Please advise vv or ov  [facility] is full today  Pt can be reached at [phone]

## 2022-01-14 NOTE — TELEPHONE ENCOUNTER
----- Message from [facility] sent at 1/13/2022  4:26 PM EST -----  Subject: Appointment Request    Reason for Call: Routine Existing Condition Follow Up    QUESTIONS  Type of Appointment? Established Patient  Reason for appointment request? No appointments available during search  Additional Information for Provider? Pt request to be seen by Dr. Byron Plummer for follow up on head aches & sinus pressure. Had a virtual with Dr Sheldon Mazariegos 1/6  ---------------------------------------------------------------------------  --------------  Ally CASTRO  What is the best way for the office to contact you? OK to leave message on   voicemail  Preferred Call Back Phone Number? [phone]  ---------------------------------------------------------------------------  --------------  SCRIPT ANSWERS  Relationship to Patient? Self  Specialty Confirmation? Primary Care  Is this follow up request related to routine Diabetes Management? No  Have you been diagnosed with, awaiting test results for, or told that you   are suspected of having COVID-19 (Coronavirus)? (If patient has tested   negative or was tested as a requirement for work, school, or travel and   not based on symptoms, answer no)? No  Within the past two weeks have you developed any of the following symptoms   (answer no if symptoms have been present longer than 2 weeks or began   more than 2 weeks ago)? Fever or Chills, Cough, Shortness of breath or   difficulty breathing, Loss of taste or smell, Sore throat, Nasal   congestion, Sneezing or runny nose, Fatigue or generalized body aches   (answer no if pain is specific to a body part e.g. back pain), Diarrhea,   Headache? No  Have you had close contact with someone with COVID-19 in the last 14 days? No  (Service Expert  click yes below to proceed with [employer] As Usual   Scheduling)?  Yes

## 2022-03-14 DIAGNOSIS — G44.019 EPISODIC CLUSTER HEADACHE, NOT INTRACTABLE: ICD-10-CM

## 2022-03-14 NOTE — TELEPHONE ENCOUNTER
I need to see Chip  I have not seen since  2019  Dr. Brea Khalil saw him and asked that he follow up with PCP (me)  No appt sched.

## 2022-03-16 NOTE — TELEPHONE ENCOUNTER
Pt scheduled Friday 03/18/2022 pt wants to discuss this medication with Dr Carreon before refilling medication LOUISE

## 2022-03-18 ENCOUNTER — OFFICE VISIT (OUTPATIENT)
Dept: FAMILY MEDICINE CLINIC | Age: 47
End: 2022-03-18
Payer: COMMERCIAL

## 2022-03-18 VITALS
DIASTOLIC BLOOD PRESSURE: 94 MMHG | HEIGHT: 75 IN | SYSTOLIC BLOOD PRESSURE: 138 MMHG | OXYGEN SATURATION: 96 % | WEIGHT: 290 LBS | HEART RATE: 90 BPM | RESPIRATION RATE: 12 BRPM | BODY MASS INDEX: 36.06 KG/M2

## 2022-03-18 DIAGNOSIS — G44.019 EPISODIC CLUSTER HEADACHE, NOT INTRACTABLE: ICD-10-CM

## 2022-03-18 DIAGNOSIS — I10 ESSENTIAL HYPERTENSION: Primary | ICD-10-CM

## 2022-03-18 DIAGNOSIS — L98.9 SKIN LESION: ICD-10-CM

## 2022-03-18 PROCEDURE — 99214 OFFICE O/P EST MOD 30 MIN: CPT | Performed by: INTERNAL MEDICINE

## 2022-03-18 ASSESSMENT — ENCOUNTER SYMPTOMS
COUGH: 0
SHORTNESS OF BREATH: 0

## 2022-03-18 NOTE — PROGRESS NOTES
Venice Rey (:  1975) is a 42 y.o. male, here for evaluation of the following chief complaint(s):  Headache (f/u)         ASSESSMENT/PLAN:  Jessica Solo was seen today for headache. Diagnoses and all orders for this visit:    Essential hypertension    Episodic cluster headache, not intractable  -     verapamil (CALAN SR) 120 MG extended release tablet; Take 1 tablet by mouth 2 times daily    Skin lesion    bp is high  Needs to take verapamil  Bid not once a day  ( he misunderstood how to take it)  Take home bp  If staying high ,call back and will add more medication    Will establish with a doctor closer to new home  Has a dermatologist that he can see  SUBJECTIVE/OBJECTIVE:  HPI  Started getting headache  , saw Timothy Amado and was treated for a sinus infection  Then saw Dariana Rangel for the headaches recurring  Last   45 min -1 hour  She was put on steroid and verapamil  Headache went away    Saw dr Cherri Bentley 24 and on     Headache have gone away  Not cking bp at home    Has been taking the verapamil only once a day. Will be getting a new [facility]cp [facility] because [place]  Will be seeing the new doctor on  with NP    Used to take daily aleve , less often now for hips       Has a new skin lesion on the back right side of the head  Bleeding in she shower    Review of Systems   Respiratory: Negative for cough and shortness of breath. Cardiovascular: Negative for chest pain and leg swelling. Neurological: Negative for dizziness and headaches. Objective   Physical Exam  Constitutional:       Appearance: Normal appearance. He is well-developed. HENT:      Head: Normocephalic and atraumatic. Cardiovascular:      Rate and Rhythm: Normal rate and regular rhythm. Heart sounds: Normal heart sounds. No murmur heard. Pulmonary:      Breath sounds: Normal breath sounds. No wheezing. Abdominal:      Palpations: Abdomen is soft. Tenderness: There is no abdominal tenderness. Skin:     General: Skin is warm and dry. Comments: Has raised papule   Excoriated   Neurological:      Mental Status: He is alert. Psychiatric:         Mood and Affect: Mood normal.         Behavior: Behavior normal.         Thought Content:  Thought content normal.         Judgment: Judgment normal.            Angy Calixto MD

## 2022-03-30 ENCOUNTER — TELEPHONE (OUTPATIENT)
Dept: FAMILY MEDICINE CLINIC | Age: 47
End: 2022-03-30

## 2022-03-30 ENCOUNTER — OFFICE VISIT (OUTPATIENT)
Dept: FAMILY MEDICINE CLINIC | Age: 47
End: 2022-03-30
Payer: COMMERCIAL

## 2022-03-30 VITALS
HEART RATE: 80 BPM | BODY MASS INDEX: 35.81 KG/M2 | WEIGHT: 288 LBS | HEIGHT: 75 IN | OXYGEN SATURATION: 98 % | DIASTOLIC BLOOD PRESSURE: 100 MMHG | RESPIRATION RATE: 12 BRPM | SYSTOLIC BLOOD PRESSURE: 142 MMHG

## 2022-03-30 DIAGNOSIS — H93.12 LEFT-SIDED TINNITUS: ICD-10-CM

## 2022-03-30 DIAGNOSIS — I10 ESSENTIAL HYPERTENSION: Primary | ICD-10-CM

## 2022-03-30 PROCEDURE — 99214 OFFICE O/P EST MOD 30 MIN: CPT | Performed by: INTERNAL MEDICINE

## 2022-03-30 RX ORDER — LOSARTAN POTASSIUM 25 MG/1
25 TABLET ORAL DAILY
Qty: 30 TABLET | Refills: 0 | Status: SHIPPED | OUTPATIENT
Start: 2022-03-30 | End: 2022-04-11 | Stop reason: SDUPTHER

## 2022-03-30 NOTE — PROGRESS NOTES
Rosa Charlton (:  1975) is a 42 y.o. male, here for evaluation of the following chief complaint(s):  No chief complaint on file. ASSESSMENT/PLAN:    Diagnoses and all orders for this visit:    Essential hypertension  -     losartan (COZAAR) 25 MG tablet; Take 1 tablet by mouth daily Take one daily as needed for blood pressure control    Left-sided tinnitus    for verapamil can take  2 in the am and one in the evening  If bp does not come down then cozaar  Est with new pcp because he moved . SUBJECTIVE/OBJECTIVE:  HPI   3 days ago started with ear ringing that has not stopped  Bought a automatic bp cuff. Had bp of  160 at home also  Had  145/98  Not sleep much due to tinitis in the left ear started 3 nights ago- dozing a little    [facility] and bp 160's90-100    No ear pain  Bought some drops for it  Put some oil in the ear. Hurt when he put the oil in the ear    Had some sinus congestion and pain going into that ear  Took otc med and they symptom went awy    Review of Systems   Constitutional: Negative for chills and fever. HENT:        No sinus congestion now     Neurological: Negative for dizziness and headaches. No headache other than sinus          Objective   Physical Exam  Constitutional:       Appearance: Normal appearance. Cardiovascular:      Rate and Rhythm: Normal rate and regular rhythm. Heart sounds: Normal heart sounds. Pulmonary:      Effort: Pulmonary effort is normal.   Neurological:      Mental Status: He is alert. Psychiatric:         Mood and Affect: Mood normal.         Behavior: Behavior normal.         Thought Content:  Thought content normal.         Judgment: Judgment normal.            Sivan Marion MD

## 2022-04-11 ENCOUNTER — OFFICE VISIT (OUTPATIENT)
Dept: FAMILY MEDICINE CLINIC | Age: 47
End: 2022-04-11
Payer: COMMERCIAL

## 2022-04-11 VITALS
DIASTOLIC BLOOD PRESSURE: 82 MMHG | HEIGHT: 75 IN | SYSTOLIC BLOOD PRESSURE: 128 MMHG | WEIGHT: 289 LBS | HEART RATE: 80 BPM | RESPIRATION RATE: 18 BRPM | OXYGEN SATURATION: 98 % | BODY MASS INDEX: 35.93 KG/M2

## 2022-04-11 DIAGNOSIS — G44.019 EPISODIC CLUSTER HEADACHE, NOT INTRACTABLE: ICD-10-CM

## 2022-04-11 DIAGNOSIS — I10 ESSENTIAL HYPERTENSION: ICD-10-CM

## 2022-04-11 DIAGNOSIS — H93.12 LEFT-SIDED TINNITUS: ICD-10-CM

## 2022-04-11 DIAGNOSIS — Z80.0 FAMILY HISTORY OF COLON CANCER: ICD-10-CM

## 2022-04-11 DIAGNOSIS — F41.9 ANXIETY: ICD-10-CM

## 2022-04-11 DIAGNOSIS — Z76.89 ENCOUNTER TO ESTABLISH CARE: Primary | ICD-10-CM

## 2022-04-11 DIAGNOSIS — F51.04 PSYCHOPHYSIOLOGICAL INSOMNIA: ICD-10-CM

## 2022-04-11 DIAGNOSIS — Z12.11 SCREENING FOR MALIGNANT NEOPLASM OF COLON: ICD-10-CM

## 2022-04-11 PROCEDURE — 99214 OFFICE O/P EST MOD 30 MIN: CPT | Performed by: NURSE PRACTITIONER

## 2022-04-11 RX ORDER — VERAPAMIL HYDROCHLORIDE 120 MG/1
TABLET, FILM COATED ORAL
Qty: 270 TABLET | Refills: 3 | Status: SHIPPED | OUTPATIENT
Start: 2022-04-11 | End: 2022-05-13 | Stop reason: SINTOL

## 2022-04-11 RX ORDER — PREDNISONE 10 MG/1
TABLET ORAL
Qty: 21 TABLET | Refills: 0 | Status: SHIPPED | OUTPATIENT
Start: 2022-04-11 | End: 2022-05-11 | Stop reason: ALTCHOICE

## 2022-04-11 RX ORDER — LOSARTAN POTASSIUM 25 MG/1
25 TABLET ORAL DAILY
Qty: 90 TABLET | Refills: 3 | Status: SHIPPED | OUTPATIENT
Start: 2022-04-11 | End: 2022-05-05

## 2022-04-11 RX ORDER — VERAPAMIL HYDROCHLORIDE 120 MG/1
120 TABLET, FILM COATED ORAL
COMMUNITY
End: 2022-04-11 | Stop reason: SDUPTHER

## 2022-04-11 ASSESSMENT — ENCOUNTER SYMPTOMS
SINUS PRESSURE: 0
SHORTNESS OF BREATH: 0
SINUS PAIN: 0
CHEST TIGHTNESS: 0
EYE DISCHARGE: 0
ABDOMINAL PAIN: 0
DIARRHEA: 0
NAUSEA: 0
COUGH: 0
ABDOMINAL DISTENTION: 0
COLOR CHANGE: 0
BACK PAIN: 0
CONSTIPATION: 0

## 2022-04-11 NOTE — PATIENT INSTRUCTIONS
Patient Education        Tinnitus: Care Instructions  Overview     Many people have some ringing sounds in their ears once in a while. You may hear a roar, a hiss, a tinkle, or a buzz. The sound usually lasts only a few minutes. Ringing in the ears that doesn't get better or go away is calledtinnitus. Tinnitus is usually caused by long-term exposure to loud noise. This damages the nerves in the inner ear. It can occur with all types of hearing loss. Itmay be a symptom of almost any ear problem. Tinnitus may be caused by a buildup of earwax. Or it may be caused by ear infections or certain medicines (especially antibiotics or large amounts of aspirin). You can also hear noises in your ears because of an injury to theears or a medical condition. You may need tests to evaluate your hearing and to find causes of long-lastingtinnitus. Your doctor may suggest one or more treatments to help you cope with it. Sahron Bonilla also do things at home to help reduce symptoms. Follow-up care is a key part of your treatment and safety. Be sure to make and go to all appointments, and call your doctor if you are having problems. It's also a good idea to know your test results and keep alist of the medicines you take. How can you care for yourself at home?  Do not smoke or use other tobacco products. Nicotine reduces blood flow to the ear and makes tinnitus worse. If you need help quitting, talk to your doctor about stop-smoking programs and medicines. These can increase your chances of quitting for good.  Talk to your doctor about whether to stop taking aspirin and similar products such as ibuprofen or naproxen.  Get exercise often. It can improve blood flow to the ear. Ways to cope with noise  Some tinnitus may last a long time. To cope with noise, try to:   Avoid noises that you think caused your tinnitus. If you can't avoid loud noises, wear earplugs or earmuffs.    Ignore the sound by paying attention to other things.  Relax using biofeedback, meditation, or yoga. Feeling stressed and being tired can make tinnitus worse.  Play music or white noise to help you sleep. Background noise may cover up the noise that you hear in your ears. You can buy a machine that makes soothing sounds, such as ocean waves. When should you call for help? Call 911 anytime you think you may need emergency care. For example, call if:     You have symptoms of a stroke. These may include:  ? Sudden numbness, tingling, weakness, or loss of movement in your face, arm, or leg, especially on only one side of your body. ? Sudden vision changes. ? Sudden trouble speaking. ? Sudden confusion or trouble understanding simple statements. ? Sudden problems with walking or balance. ? A sudden, severe headache that is different from past headaches. Call your doctor now or seek immediate medical care if:     You develop other symptoms. These may include hearing loss (or worse hearing loss), balance problems, dizziness, nausea, or vomiting. Watch closely for changes in your health, and be sure to contact your doctor if:     Your tinnitus moves from both ears to one ear.      Your hearing loss gets worse within 1 day after an ear injury.      Your tinnitus or hearing loss does not get better within 1 week after an ear injury.      Your tinnitus bothers you enough that you want to take medicines to help you cope with it. Where can you learn more? Go to [email or web address]://[email or web address] org and sign in to your [employer] account. Enter S165 in the [employer] box to learn more about \"Tinnitus: Care Instructions. \"     If you do not have an account, please click on the \"Sign Up Now\" link. Current as of: September 8, 2021               Content Version: 13.2  © 1747-2088 [employer]wise, [place]. Care instructions adapted under license by [facility] ([facility]).  If you have questions about a medical condition or this instruction, always ask your healthcare professional. [place] [address] any warranty or liability for your use of this information. Patient Education        trazodone  Pronunciation: Linda Floyd oh done  Brand: Juan Ramon  What is the most important information I should know about trazodone? Some young people have thoughts about suicide when first taking an antidepressant. Stay alert to changes in your mood or symptoms. Report any new or worsening symptoms to your doctor. Trazodone is not approved for use by anyone younger than 25years old. What is trazodone? Trazodone is an antidepressant that is used to treat major depressive disorder. Trazodone may also be used for purposes not listed in this medication guide. What should I discuss with my healthcare provider before taking trazodone? You should not use trazodone if you are allergic to it. Do not use trazodone if you have used an MAO inhibitor in the past 14 days. A dangerous drug interaction could occur. MAO inhibitors include isocarboxazid, linezolid, methylene blue injection, phenelzine,tranylcypromine, and others. After you stop taking trazodone, you must wait at least 14 days before you start taking an MAOI. Tell your doctor if you also take stimulant medicine, opioid medicine, herbal products, or medicine for depression, mental illness, Parkinson's disease, migraine headaches, serious infections, or prevention of nausea and vomiting. An interaction with trazodone could cause a serious condition called serotonin syndrome. Tell your doctor if you have ever had:   liver or kidney disease;   heart disease, or a recent heart attack;   a bleeding or blood clotting disorder;   seizures or epilepsy;   narrow-angle glaucoma;   long QT syndrome;   drug addiction or suicidal thoughts; or   bipolar disorder (manic depression). Some young people have thoughts about suicide when first taking an antidepressant.  Your doctor should check your progress at regular visits. Your family or other caregivers should also be alert to changes in your mood orsymptoms. Taking this medicine during pregnancy could harm the baby, but stopping the medicine may not be safe for you. Do not start or stop trazodone without asking your doctor. If you are pregnant, your name may be listed on a pregnancy registry to trackthe effects of trazodone on the baby. Ask a doctor if it is safe to breastfeed while using this medicine. Trazodone is not approved for use by anyone younger than 25years old. How should I take trazodone? Follow all directions on your prescription label and read all medication guides or instruction sheets. Your doctor may occasionally change your dose. Use themedicine exactly as directed. Take trazodone after a meal or a snack. Your symptoms may not improve for up to 2 weeks. Do not stop using trazodone suddenly, or you could have unpleasant symptoms (such as agitation, confusion, tinglingor electric shock feelings). Ask your doctor before stopping the medicine. Store at room temperature away from moisture, heat, and light. What happens if I miss a dose? Take the medicine as soon as you can, but skip the missed dose if it is almost time for your next dose. Do not take two doses at one time. What happens if I overdose? Seek emergency medical attention or call the Poison Help line at 1-977.204.2974. An overdose can be fatal when trazodone is taken with alcohol, barbiturates such as phenobarbital, or sedatives such as diazepam (Valium). Overdose symptoms may include extreme drowsiness, vomiting, penis erection that is painful or prolonged, fast or pounding heartbeat, seizure (black-out orconvulsions), or breathing that slows or stops. What should I avoid while taking trazodone? Do not drink alcohol. Dangerous side effects or death could occur.   Ask your doctor before taking a nonsteroidal anti-inflammatory drug (NSAID) such as aspirin, ibuprofen, naproxen, Advil, Aleve, Motrin, and others. Usingan NSAID with trazodone may cause you to bruise or bleed easily. Avoid driving or hazardous activity until you know how this medicine willaffect you. Your reactions could be impaired. Avoid getting up too fast from a sitting or lying position, or you may feeldizzy. What are the possible side effects of trazodone? Get emergency medical help if you have signs of an allergic reaction: hives; difficulty breathing; swelling of your face, lips, tongue, or throat. Stop taking trazodone and call your doctor at once if you have a penis erection that is painful or lasts 6 hours or longer. This is a medical emergency andcould lead to a serious condition that must be corrected with surgery. Report any new or worsening symptoms to your doctor, such as: mood or behavior changes, anxiety, panic attacks, trouble sleeping, or if you feel impulsive, irritable, agitated, hostile, aggressive, restless, hyperactive (mentally or physically), more depressed, or have thoughts aboutsuicide or hurting yourself. Call your doctor at once if you have:   fast or pounding heartbeats, fluttering in your chest, shortness of breath, and sudden dizziness (like you might pass out);   slow heartbeats;   unusual thoughts or behavior;   easy bruising, unusual bleeding; or   low levels of sodium in the body --headache, confusion, slurred speech, severe weakness, vomiting, loss of coordination, feeling unsteady. Seek medical attention right away if you have symptoms of serotonin syndrome, such as: agitation, hallucinations, fever, sweating, shivering, fast heart rate, musclestiffness, twitching, loss of coordination, nausea, vomiting, or diarrhea. Common side effects may include:   drowsiness, dizziness, tiredness;   swelling;   weight loss;   blurred vision;   diarrhea, constipation; or   stuffy nose. This is not a complete list of side effects and others may occur.  Call your doctor for medical advice about side effects. You may report side effects toFDA at 8-256-PTC-9447. What other drugs will affect trazodone? Using trazodone with other drugs that make you drowsy can worsen this effect. Ask your doctor before using opioid medication, a sleeping pill, a musclerelaxer, or medicine for anxiety or seizures. Tell your doctor about all your current medicines. Many drugs can affect trazodone, especially:   any other antidepressants;   phenytoin;   Anne's wort;   tramadol;   a diuretic or \"water pill\";   medicine to treat anxiety, mood disorders, or mental illness such as schizophrenia;   a blood thinner --warfarin, Coumadin, Jantoven; or   migraine headache medicine --sumatriptan, Imitrex, Maxalt, Treximet, and others. This list is not complete and many other drugs may affect trazodone. This includes prescription and over-the-counter medicines, vitamins, andherbal products. Not all possible drug interactions are listed here. Where can I get more information? Your pharmacist can provide more information about trazodone. Remember, keep this and all other medicines out of the reach of children, never share your medicines with others, and use this medication only for the indication prescribed. Every effort has been made to ensure that the information provided by [address] is accurate, up-to-date, and complete, but no guarantee is made to that effect. Drug information contained herein may be time sensitive. [facility] information has been compiled for use by healthcare practitioners and consumers in the [place] and therefore [facility] does not warrant that uses outside of the [place] are appropriate, unless specifically indicated otherwise. [facility]'s drug information does not endorse drugs, diagnose patients or recommend therapy.  [facility]'s drug information is an informational resource designed to assist licensed healthcare practitioners in caring for their patients and/or to serve consumers viewing this service as a supplement to, and not a substitute for, the expertise, skill, knowledge and judgment of healthcare practitioners. The absence of a warning for a given drug or drug combination in no way should be construed to indicate that the drug or drug combination is safe, effective or appropriate for any given patient. [facility] does not assume any responsibility for any aspect of healthcare administered with the aid of information [facility] provides. The information contained herein is not intended to cover all possible uses, directions, precautions, warnings, drug interactions, allergic reactions, or adverse effects. If you have questions about the drugs you are taking, check with yourdoctor, nurse or pharmacist.  Copyright 5531-5380 [address]: 10.04. Revision date:6/14/2021. Care instructions adapted under license by [facility] [place] ([facility]). If you have questions about a medical condition or this instruction, always ask your healthcare professional. [place] [address] any warranty or liability for your use of this information. Patient Education        buspirone  Pronunciation: shannon beckett  Brand: BuSpar  What is the most important information I should know about buspirone? Do not use this medicine if you have used an MAO inhibitor in the past 14 days, such as isocarboxazid, linezolid, methylene blue injection, phenelzine,rasagiline, selegiline, or tranylcypromine. What is buspirone? Buspirone is used to treat symptoms of anxiety, such as fear, tension,irritability, dizziness, pounding heartbeat, and other physical symptoms. Buspirone is not an anti-psychotic medication and should not be used in place of medication prescribed by your doctor formental illness. Buspirone may also be used for purposes not listed in this medication guide. What should I discuss with my healthcare provider before taking buspirone?   You should not use buspirone if you are allergic to it. Do not use buspirone if you have used an MAO inhibitor in the past 14 days. A dangerous drug interaction could occur. MAO inhibitors include isocarboxazid, linezolid, methylene blue injection, phenelzine, rasagiline,selegiline, tranylcypromine, and others. You may need to wait 14 days after stopping buspirone before you can take an MAOI. Tell your doctor if you have ever had:   kidney disease; or   liver disease. Be sure your doctor knows if you also take stimulant medicine, opioid medicine, herbal products, or medicine for depression, mental illness, Parkinson's disease, migraine headaches, serious infections, or prevention of nausea and vomiting. These medicines may interact with buspirone and cause a serious condition called serotonin syndrome. Tell your doctor if you are pregnant. You should not breastfeed while using buspirone. Do not give this medicine to a child without medical advice. How should I take buspirone? Follow all directions on your prescription label and read all medication guides or instruction sheets. Your doctor may occasionally change your dose. Use themedicine exactly as directed. You may take buspirone with or without food but take it the same way each time. If you have switched to buspirone from another anxiety medication, you may need to slowly decrease your dose of the other medication rather than stopping suddenly. Some anxiety medications can cause withdrawal symptoms whenyou stop taking them suddenly after long-term use. Read and carefully follow any Instructions for Use provided with your medicine. Ask your doctor or pharmacist if you do not understand these instructions. Some buspirone tablets are scored so you can break the tablet into 2 or 3 pieces in order to take a smaller dose. Do not use a buspirone tablet if it has not been broken correctly and the piece is too big or too small.  Follow yourdoctor's instructions about how much of the tablet to take. Buspirone can cause false positive results with certain medical tests. You may need to stop using the medicine for at least 48 hours before your test. [facility] doctor who treats you that you are using buspirone. Store at room temperature away from moisture, heat, and light. What happens if I miss a dose? Take the medicine as soon as you can, but skip the missed dose if it is almost time for your next dose. Do not take two doses at one time. What happens if I overdose? Seek emergency medical attention or call the Poison Help line at 1-828.904.8982. What should I avoid while taking buspirone? Avoid driving or hazardous activity until you know how this medicine willaffect you. Your reactions could be impaired. Drinking alcohol may increase certain side effects of buspirone. Grapefruit may interact with buspirone and lead to unwanted side effects. Avoid the use of grapefruit products. What are the possible side effects of buspirone? Get emergency medical help if you have signs of an allergic reaction: hives; difficult breathing; swelling of your face, lips, tongue, or throat. Call your doctor at once if you have:   chest pain;   shortness of breath; or   a light-headed feeling, like you might pass out. Seek medical attention right away if you have symptoms of serotonin syndrome, such as: agitation, hallucinations, fever, sweating, shivering, fast heart rate, musclestiffness, twitching, loss of coordination, nausea, vomiting, or diarrhea. Common side effects may include:   headache;   dizziness, feeling light-headed;   nausea; or   feeling nervous or excited. This is not a complete list of side effects and others may occur. Call your doctor for medical advice about side effects. You may report side effects toFDA at 4-228-WUA-7528. What other drugs will affect buspirone? Using buspirone with other drugs that make you drowsy or slow your breathing can worsen these effects.  Ask your doctor before using opioid medication, asleeping pill, a muscle relaxer, or medicine for anxiety or seizures. Tell your doctor about all your current medicines. Many drugs can affect buspirone, especially:   nefazodone;   Anne's wort;   an antibiotic --clarithromycin, erythromycin, rifabutin, rifampin, rifapentine, telithromycin;   antifungal medicine --itraconazole, ketoconazole;   antiviral medicine to treat HIV/AIDS --efavirenz, indinavir, nelfinavir, nevirapine, ritonavir, saquinavir;   cancer medicine --apalutamide, enzalutamide, mitotane;   heart or blood pressure medicines --diltiazem, verapamil;   seizure medicine --carbamazepine, oxcarbazepine, phenytoin, primidone; or   steroid medicine --dexamethasone, prednisone. This list is not complete and many other drugs may affect buspirone. This includes prescription and over-the-counter medicines, vitamins, andherbal products. Not all possible drug interactions are listed here. Where can I get more information? Your pharmacist can provide more information about buspirone. Remember, keep this and all other medicines out of the reach of children, never share your medicines with others, and use this medication only for the indication prescribed. Every effort has been made to ensure that the information provided by [address] is accurate, up-to-date, and complete, but no guarantee is made to that effect. Drug information contained herein may be time sensitive. [facility] information has been compiled for use by healthcare practitioners and consumers in the [place] and therefore [facility] does not warrant that uses outside of the [place] are appropriate, unless specifically indicated otherwise. [facility]'s drug information does not endorse drugs, diagnose patients or recommend therapy.  [facility]'s drug information is an informational resource designed to assist licensed healthcare practitioners in caring for their patients and/or to serve consumers viewing this service as a supplement to, and not a substitute for, the expertise, skill, knowledge and judgment of healthcare practitioners. The absence of a warning for a given drug or drug combination in no way should be construed to indicate that the drug or drug combination is safe, effective or appropriate for any given patient. [facility] does not assume any responsibility for any aspect of healthcare administered with the aid of information [facility] provides. The information contained herein is not intended to cover all possible uses, directions, precautions, warnings, drug interactions, allergic reactions, or adverse effects. If you have questions about the drugs you are taking, check with yourdoctor, nurse or pharmacist.  Copyright 6343-8229 [address] Version: 6.01. Revision date: 2/24/2020. Care instructions adapted under license by [facility] ([facility]). If you have questions about a medical condition or this instruction, always ask your healthcare professional. [place] [address] any warranty or liability for your use of this information. Patient Education        escitalopram  Pronunciation: GUERDA fernandez  Brand: Lexapro  What is the most important information I should know about escitalopram?  You should not use this medicine you also take pimozide or citalopram (Celexa). Do not use escitalopram within 14 days before or 14 days after you have used an MAO inhibitor, such as isocarboxazid, linezolid, methylene blue injection, phenelzine,rasagiline, selegiline, or tranylcypromine. Some young people have thoughts about suicide when first taking an antidepressant. Stay alert to changes in your mood or symptoms. Report any new or worsening symptoms to your doctor.   Seek medical attention right away if you have symptoms of serotonin syndrome, such as: agitation, hallucinations, fever, sweating, shivering, fast heart rate, musclestiffness, twitching, loss of coordination, nausea, vomiting, or diarrhea. Do not stop using escitalopram without first asking your doctor. What is escitalopram?  Escitalopram is a selective serotonin reuptake inhibitor SSRI antidepressant. Escitalopram is used to treat major depressive disorder in adults andadolescents at least 15years old. Escitalopram is also used to treat anxiety in adults. Escitalopram may also be used for purposes not listed in this medication guide. What should I discuss with my healthcare provider before taking escitalopram?  You should not use this medicine if you are allergic to escitalopram orcitalopram (Celexa), or if:   you also take pimozide. Do not use escitalopram within 14 days before or 14 days after you have used an MAO inhibitor. A dangerous drug interaction could occur. MAO inhibitors include isocarboxazid, linezolid, phenelzine, rasagiline, selegiline, andtranylcypromine. Be sure your doctor knows if you also take stimulant medicine, opioid medicine, herbal products, or medicine for depression, mental illness, Parkinson's disease, migraine headaches, serious infections, or prevention of nausea and vomiting. These medicines may interact with escitalopram and cause a serious condition called serotonin syndrome. Tell your doctor if you have ever had:   liver or kidney disease;   seizures;   low levels of sodium in your blood;   heart disease, high blood pressure;   a stroke;   bleeding problems;   sexual problems;   bipolar disorder (manic depression); or   drug addiction or suicidal thoughts. Some young people have thoughts about suicide when first taking an antidepressant. Your doctor should check your progress at regular visits. Your family or other caregivers should also be alert to changes in your mood orsymptoms. Escitalopram is not approved for use by anyone younger than 15years old. Ask your doctor about taking this medicine if you are pregnant.  Taking an SSRI antidepressant during late pregnancy may cause serious medical complications in the baby. However, you may have a relapse of depression if you stop taking your antidepressant. Tell your doctor right away if you become pregnant. Do not start or stop taking this medicine without your doctor's advice. If you are pregnant, your name may be listed on a pregnancy registry to trackthe effects of escitalopram on the baby. If you are breastfeeding, tell your doctor if you notice drowsiness, agitation,feeding problems, or poor weight gain in the nursing baby. How should I take escitalopram?  Follow all directions on your prescription label and read all medication guides or instruction sheets. Your doctor may occasionally change your dose. Use themedicine exactly as directed. Take the medicine at the same time each day, with or without food. Measure liquid medicine carefully. Use the dosing syringe provided, or use a medicine dose-measuringdevice (not a kitchen spoon). It may take up to 4 weeks before your symptoms improve. Keep using themedication as directed and tell your doctor if your symptoms do not improve. Tell your doctor if you have any changes in sexual function, such as loss of interest in sex, trouble having an orgasm, or (in men)problems with erections or ejaculation. Some sexual problems can be treated. Your doctor will need to check your progress on a regular basis. A child takingescitalopram should be checked for height and weight gain. Do not stop using escitalopram suddenly, or you could have unpleasant withdrawal symptoms. Follow your doctor'sinstructions about tapering your dose. Store at room temperature away from moisture and heat. What happens if I miss a dose? Take the medicine as soon as you can, but skip the missed dose if it is almost time for your next dose. Do not take two doses at one time. What happens if I overdose?   Seek emergency medical attention or call the Poison Help line at 1-844.638.3680. What should I avoid while taking escitalopram?  Ask your doctor before taking a nonsteroidal anti-inflammatory drug (NSAID) such as aspirin, ibuprofen (Advil, Motrin), naproxen (Aleve), celecoxib (Celebrex), diclofenac, indomethacin, meloxicam, and others. Using an NSAIDwith escitalopram may cause you to bruise or bleed easily. Avoid alcohol. Avoid driving or hazardous activity until you know how this medicine willaffect you. Your reactions could be impaired. What are the possible side effects of escitalopram?  Get emergency medical help if you have signs of an allergic reaction: skin rash or hives; difficulty breathing; swelling of your face, lips, tongue,or throat. Report any new or worsening symptoms to your doctor, such as: mood or behavior changes, anxiety, panic attacks, trouble sleeping, or if you feel impulsive, irritable, agitated, hostile, aggressive, restless, hyperactive (mentally or physically), more depressed, or have thoughts aboutsuicide or hurting yourself. Call your doctor at once if you have:   blurred vision, tunnel vision, eye pain or swelling, or seeing halos around lights;   racing thoughts, unusual risk-taking behavior, feelings of extreme happiness or sadness;   pain or burning when you urinate;   (in a child taking escitalopram) slow growth or weight gain;   low levels of sodium in the body --headache, confusion, slurred speech, severe weakness, vomiting, loss of coordination, feeling unsteady; or   severe nervous system reaction --very stiff (rigid) muscles, high fever, sweating, confusion, fast or uneven heartbeats, tremors, feeling like you might pass out. Seek medical attention right away if you have symptoms of serotonin syndrome, such as: agitation, hallucinations, fever, sweating, shivering, fast heart rate, musclestiffness, twitching, loss of coordination, nausea, vomiting, or diarrhea.   Common side effects may include:   painful urination;   dizziness, drowsiness, tiredness, weakness;   feeling anxious or agitated;   increased muscle movements, feeling shaky;   sleep problems (insomnia);   sweating, dry mouth, increased thirst, loss of appetite;   nausea, constipation;   yawning;   nosebleed, heavy menstrual periods; or   decreased sex drive, impotence, or difficulty having an orgasm. This is not a complete list of side effects and others may occur. Call your doctor for medical advice about side effects. You may report side effects toFDA at 6-017-JPN-3568. What other drugs will affect escitalopram?  Using escitalopram with other drugs that make you drowsy can worsen this effect. Ask your doctor before using opioid medication, a sleeping pill, amuscle relaxer, or medicine for anxiety or seizures. Tell your doctor about all your current medicines, especially a blood thinner such as warfarin, Coumadin, or Jantoven. Many drugs can affect escitalopram, and some drugs should not be used at the same time. Tell your doctor about all your current medicines and any medicine you start or stop using. This includes prescription and over-the-counter medicines, vitamins, and herbal products. Not all possible interactions are listed here. Where can I get more information? Your pharmacist can provide more information about escitalopram.  Remember, keep this and all other medicines out of the reach of children, never share your medicines with others, and use this medication only for the indication prescribed. Every effort has been made to ensure that the information provided by [address] is accurate, up-to-date, and complete, but no guarantee is made to that effect. Drug information contained herein may be time sensitive.  [facility] information has been compiled for use by healthcare practitioners and consumers in the [place] and therefore [facility] does not warrant that uses outside of the [place] are appropriate, unless specifically indicated otherwise. [facility] drug information does not endorse drugs, diagnose patients or recommend therapy. [facility]'s drug information is an informational resource designed to assist licensed healthcare practitioners in caring for their patients and/or to serve consumers viewing this service as a supplement to, and not a substitute for, the expertise, skill, knowledge and judgment of healthcare practitioners. The absence of a warning for a given drug or drug combination in no way should be construed to indicate that the drug or drug combination is safe, effective or appropriate for any given patient. [facility] does not assume any responsibility for any aspect of healthcare administered with the aid of information [facility] provides. The information contained herein is not intended to cover all possible uses, directions, precautions, warnings, drug interactions, allergic reactions, or adverse effects. If you have questions about the drugs you are taking, check with yourdoctor, nurse or pharmacist.  Copyright 5059-0003 [address] Version: 18.01. Revision date:11/8/2021. Care instructions adapted under license by [address] [place] [address]. If you have questions about a medical condition or this instruction, always ask your healthcare professional. [place] [address] any warranty or liability for your use of this information. Patient Education        fluoxetine  Pronunciation: chrissy KRISHNAMURTHY e teen  Brand: PROzac  What is the most important information I should know about fluoxetine? You should not use fluoxetine if you also take pimozide or thioridazine. Do not use this medicine if you have used an MAO inhibitor in the past 14 days, such as isocarboxazid, linezolid, methylene blue injection, phenelzine, rasagiline, selegiline, or tranylcypromine. Wait at least 14 days after stopping an MAO inhibitor before you take fluoxetine.  Wait 5 weeks after stopping fluoxetine before you take thioridazine or an MAOI. Some young people have thoughts about suicide when first taking an antidepressant. Stay alert to changes in your mood or symptoms. Report any new or worsening symptoms to your doctor. Do not stop using fluoxetine without first asking your doctor. What is fluoxetine? Fluoxetine is a selective serotonin reuptake inhibitors (SSRI) antidepressant. Fluoxetine is used to treat major depressive disorder, bulimia nervosa (an eating disorder) obsessive-compulsive disorder, panic disorder, andpremenstrual dysphoric disorder (PMDD). Fluoxetine is sometimes used together with olanzapine (Zyprexa) to treat manic depression caused by bipolar disorder. This combination is also used to treatdepression after at least 2 other medications have failed. If you also take olanzapine (Zyprexa), read the Zyprexa medication guide and all patient warnings and instructionsprovided with that medication. Fluoxetine may also be used for purposes not listed in this medication guide. What should I discuss with my healthcare provider before taking fluoxetine? You should not use fluoxetine if you are allergic to it, if you also takepimozide or thioridazine. Do not use fluoxetine if you have used an MAO inhibitor in the past 14 days. A dangerous drug interaction could occur. MAO inhibitors include isocarboxazid, linezolid, methylene blue injection, phenelzine, rasagiline, selegiline, and tranylcypromine. You must wait at least 14 days after stopping an MAO inhibitor before you take fluoxetine. You must wait 5 weeks after stopping fluoxetine before you can take thioridazine or an MAOI. Tell your doctor about all other antidepressants you take, especially Celexa, Cymbalta, Desyrel, Effexor, Lexapro, Luvox, Oleptro,Paxil, Pexeva, Symbyax, Viibryd, or Zoloft.   Tell your doctor if you have ever had:   cirrhosis of the liver;   urination problems;   diabetes;   narrow-angle glaucoma;   seizures or epilepsy;   sexual problems;   bipolar disorder (manic depression);   drug abuse or suicidal thoughts; or   electroconvulsive therapy (ECT). Some young people have thoughts about suicide when first taking an antidepressant. Your doctor should check your progress at regular visits. Your family or other caregivers should also be alert to changes in your mood orsymptoms. Older adults may be more sensitive to the effects of this medicine. Ask your doctor about taking this medicine if you are pregnant. Taking an SSRI antidepressant during late pregnancy may cause serious medical complications in the baby. However, you may have a relapse of depression if you stop taking your antidepressant. Tell your doctor right away if you become pregnant. If you are pregnant, your name may be listed on a pregnancy registryto track the effects of fluoxetine on the baby. If you are breastfeeding, tell your doctor if you notice agitation, fussiness,feeding problems, or poor weight gain in the nursing baby. Fluoxetine is not approved for use by anyone younger than 25years old. How should I take fluoxetine? Follow all directions on your prescription label and read all medication guides or instruction sheets. Your doctor may occasionally change your dose. Use themedicine exactly as directed. Swallow the delayed-release capsule whole and do not crush, chew, break, or open it. Measure liquid medicine carefully. Use the dosing syringe provided, or use a medicine dose-measuringdevice (not a kitchen spoon). It may take up to 4 weeks before your symptoms improve. Keep using themedication as directed and tell your doctor if your symptoms do not improve. Tell your doctor if you have any changes in sexual function, such as loss of interest in sex, trouble having an orgasm, or (in men)problems with erections or ejaculation. Some sexual problems can be treated.   Do not stop using fluoxetine suddenly, or you could have unpleasant withdrawal symptoms. Ask your doctor how tosafely stop using fluoxetine. Store at room temperature away from moisture and heat. What happens if I miss a dose? Take the medicine as soon as you can, but skip the missed dose if it is almost time for your next dose. Do not take two doses at one time. If you miss a dose of Prozac Weekly, take the missed dose as soon as you remember and take the next dose 7 days later. However, if it is almost time for the next regularly scheduled weekly dose, skip the missed dose and take the next one as directed. Do not take extra medicine to make up the missed dose. What happens if I overdose? Seek emergency medical attention or call the Poison Help line at 1-438.193.7747. What should I avoid while taking fluoxetine? Drinking alcohol can increase certain side effects of fluoxetine. Avoid driving or hazardous activity until you know how this medicine willaffect you. Your reactions could be impaired. What are the possible side effects of fluoxetine? Get emergency medical help if you have signs of an allergic reaction (hives, difficult breathing, swelling in your face or throat) or a severe skin reaction (fever, sore throat, burning eyes, skin pain, red or purple skin rash withblistering and peeling). Report any new or worsening symptoms to your doctor, such as: mood or behavior changes, anxiety, panic attacks, trouble sleeping, or if you feel impulsive, irritable, agitated, hostile, aggressive, restless, hyperactive (mentally or physically), more depressed, or have thoughts aboutsuicide or hurting yourself.   Call your doctor at once if you have:   blurred vision, tunnel vision, eye pain or swelling, or seeing halos around lights;   fast or pounding heartbeats, fluttering in your chest, shortness of breath, and sudden dizziness (like you might pass out);   low levels of sodium in the body --headache, confusion, slurred speech, severe weakness, vomiting, loss of coordination, feeling unsteady; or   severe nervous system reaction --very stiff (rigid) muscles, high fever, sweating, confusion, fast or uneven heartbeats, tremors, feeling like you might pass out. Seek medical attention right away if you have symptoms of serotonin syndrome, such as: agitation, hallucinations, fever, sweating, shivering, fast heart rate, musclestiffness, twitching, loss of coordination, nausea, vomiting, or diarrhea. Common side effects may include:   sleep problems (insomnia), strange dreams;   headache, dizziness, drowsiness, vision changes;   tremors or shaking, feeling anxious or nervous;   pain, weakness, yawning, tired feeling;   upset stomach, loss of appetite, nausea, vomiting, diarrhea;   dry mouth, sweating, hot flashes;   changes in weight or appetite;   stuffy nose, sinus pain, sore throat, flu symptoms; or   decreased sex drive, impotence, or difficulty having an orgasm. This is not a complete list of side effects and others may occur. Call your doctor for medical advice about side effects. You may report side effects toFDA at 2-883-USN-2979. What other drugs will affect fluoxetine? Fluoxetine can cause a serious heart problem. Your risk may be higher if you also use certain other medicines for infections, asthma, heart problems, high blood pressure, depression, mentalillness, cancer, malaria, or HIV. Using fluoxetine with other drugs that make you drowsy can worsen this effect. Ask your doctor before using opioid medication, a sleeping pill, a musclerelaxer, or medicine for anxiety or seizures. Ask your doctor before taking a nonsteroidal anti-inflammatory drug (NSAID) such as aspirin, ibuprofen (Advil, Motrin), naproxen (Aleve), celecoxib (Celebrex), diclofenac, indomethacin, meloxicam, and others. Using an NSAIDwith fluoxetine may cause you to bruise or bleed easily. Tell your doctor about all your current medicines.  Many drugs can affect fluoxetine, especially:   any other antidepressant;   Anne's Wort;   tryptophan (sometimes called L-tryptophan);   a blood thinner --warfarin, Coumadin, Jantoven;   medicine to treat anxiety, mood disorders, thought disorders, or mental illness --amitriptyline, buspirone, desipramine, lithium, nortriptyline, and many others;   medicine to treat ADHD or narcolepsy --Adderall, Concerta, Ritalin, Vyvanse, Zenzedi, and others;   migraine headache medicine --rizatriptan, sumatriptan, zolmitriptan, and others; or   narcotic pain medicine --fentanyl, tramadol. This list is not complete and many other drugs may affect fluoxetine. This includes prescription and over-the-counter medicines, vitamins, andherbal products. Not all possible drug interactions are listed here. Where can I get more information? Your pharmacist can provide more information about fluoxetine. Remember, keep this and all other medicines out of the reach of children, never share your medicines with others, and use this medication only for the indication prescribed. Every effort has been made to ensure that the information provided by [address] is accurate, up-to-date, and complete, but no guarantee is made to that effect. Drug information contained herein may be time sensitive. [facility] information has been compiled for use by healthcare practitioners and consumers in the [place] and therefore [facility] does not warrant that uses outside of the [place] are appropriate, unless specifically indicated otherwise. [facility]'s drug information does not endorse drugs, diagnose patients or recommend therapy. [facility] drug information is an informational resource designed to assist licensed healthcare practitioners in caring for their patients and/or to serve consumers viewing this service as a supplement to, and not a substitute for, the expertise, skill, knowledge and judgment of healthcare practitioners.  The absence of a warning for a given drug or drug combination in no way should be construed to indicate that the drug or drug combination is safe, effective or appropriate for any given patient. [facility] does not assume any responsibility for any aspect of healthcare administered with the aid of information [facility] provides. The information contained herein is not intended to cover all possible uses, directions, precautions, warnings, drug interactions, allergic reactions, or adverse effects. If you have questions about the drugs you are taking, check with yourdoctor, nurse or pharmacist.  Copyright 9738-3093 [address]: 26.01. Revision date:11/9/2021. Care instructions adapted under license by [facility] ([facility]). If you have questions about a medical condition or this instruction, always ask your healthcare professional. [place] [address] any warranty or liability for your use of this information. Patient Education        bupropion  Pronunciation: byoo PRO pee on  Brand: Aplenzin, Forfivo XL, Wellbutrin SR, Wellbutrin XL, Zyban Advantage Pack  What is the most important information I should know about bupropion? You should not take bupropion if you have seizures or an eating disorder, or if you have suddenly stopped using alcohol, seizure medication, or sedatives. If you take Wellbutrin for depression, do not also take Zyban to quit smoking. Do not use bupropion within 14 days before or 14 days after you have used an MAO inhibitor, such as isocarboxazid, linezolid, methylene blue injection, phenelzine,rasagiline, selegiline, or tranylcypromine. Some young people have thoughts about suicide when first taking an antidepressant. Stay alert to changes in your mood or symptoms. Report any new or worsening symptoms to your doctor. What is bupropion? Bupropion is an antidepressant used to treat major depressive disorder and seasonal affective disorder.  The Zyban brand of bupropion is used to help people stop smoking by reducing cravingsand other withdrawal effects. Bupropion may also be used for purposes not listed in this medication guide. What should I discuss with my healthcare provider before taking bupropion? You should not take bupropion if you are allergic to it, or if you have:   a seizure disorder;   an eating disorder such as anorexia or bulimia; or   if you have suddenly stopped using alcohol, seizure medication, or a sedative (such as Xanax, Valium, Fiorinal, Klonopin, and others). Do not use an MAO inhibitor within 14 days before or 14 days after you take bupropion. A dangerous drug interaction could occur. MAO inhibitors include isocarboxazid, linezolid, phenelzine, rasagiline, selegiline, andtranylcypromine. Do not take bupropion to treat more than one condition at a time. If you take bupropion for depression, do not also take this medicine to quit smoking. Bupropion may cause seizures, especially if you have certain medical conditions or use certain drugs. Tellyour doctor about all of your medical conditions and the drugs you use. Tell your doctor if you have ever had:   a head injury, seizures, or brain or spinal cord tumor;   narrow-angle glaucoma;   heart disease, high blood pressure, or a heart attack;   diabetes;   kidney or liver disease (especially cirrhosis);   depression, bipolar disorder, or other mental illness; or   if you drink alcohol. Some young people have thoughts about suicide when first taking an antidepressant. Your doctor will need to check your progress at regular visits. Your family or other caregivers should also be alert to changes in your mood orsymptoms. Ask your doctor about taking this medicine if you are pregnant. It is not known whether bupropion will harm an unborn baby. However, you may have a relapse of depression if you stop taking your antidepressant. Tell your doctor right away if you become pregnant.  Do not start or stop taking bupropion without your doctor's advice. If you are pregnant, your name may be listed on a pregnancy registry to trackthe effects of bupropion on the baby. It may not be safe to breastfeed while using this medicine. Ask your doctorabout any risk. Bupropion is not approved for use by anyone younger than 25years old. How should I take bupropion? Follow all directions on your prescription label and read all medication guides or instruction sheets. Your doctor may occasionally change your dose. Use the medicine exactly as directed. Too much of this medicine can increase your risk of a seizure. You may take bupropion with or without food. Swallow the extended-release tablet whole and do not crush, chew, or break it. You should not change your dose or stop using bupropion suddenly, unless you have a seizure while taking this medicine. Stopping suddenly can cause unpleasant withdrawal symptoms. Ask your doctorhow to safely stop using bupropion. If you take Zyban to help you stop smoking, you may continue to smoke for about 1 week after you start the medicine. Set a date to quit smoking during the first 2 weeks of treatment. Talk to your doctor if you have trouble quitting after taking Zybanfor 7 to 12 weeks. Your doctor may prescribe a nicotine replacement product (such as patches or gum) to help you stop smoking. Start using the nicotine replacement product onthe same day you stop (quit) smoking or using tobacco products. Some people taking bupropion (Wellbutrin or Zyban) have had high blood pressure that is severe, especially when also using a nicotine replacement product (patch or gum). Your blood pressure may need to be checked before and duringtreatment with bupropion. Read and carefully follow any Instructions for Use provided with your medicine. Ask your doctor or pharmacist if you do not understand these instructions.   You may have nicotine withdrawal symptoms when you stop smoking, including: increased appetite, weight gain, trouble sleeping, trouble concentrating, slower heart rate, having the urge to smoke, and feeling anxious, restless, depressed, angry, frustrated, or irritated. These symptoms may occur with or without using medication such as Zyban. Smoking cessation may also cause new or worsening mental health problems, such as depression. This medicine may affect a drug-screening urine test and you may have falseresults. Tell the laboratory staff that you use bupropion. Store at room temperature away from moisture, heat, and light. What happens if I miss a dose? Skip the missed dose and use your next dose at the regular time. Do not use two doses at one time. What happens if I overdose? Seek emergency medical attention or call the Poison Help line at 1-186.621.7374. An overdose of bupropion can be fatal.  Overdose symptoms may include muscle stiffness, hallucinations, fast or unevenheartbeat, shallow breathing, or fainting. What should I avoid while taking bupropion? Drinking alcohol with bupropion may increase your risk of seizures. If you drink alcohol regularly, talk with your doctor before changing the amount you drink. Bupropion can also cause seizures in a regular drinker who suddenlystops drinking at the start of treatment with bupropion. Avoid driving or hazardous activity until you know how this medicine willaffect you. Your reactions could be impaired. What are the possible side effects of bupropion? Get emergency medical help if you have signs of an allergic reaction (hives, itching, fever, swollen glands, difficult breathing, swelling in your face or throat) or a severe skin reaction (fever, sore throat, burning eyes, skin pain, red or purple skin rash withblistering and peeling).   Report any new or worsening symptoms to your doctor, such as: mood or behavior changes, anxiety, depression, panic attacks, trouble sleeping, or if you feel impulsive, irritable, agitated, hostile, aggressive, restless, hyperactive (mentally or physically), more depressed, orhave thoughts about suicide or hurting yourself. Call your doctor at once if you have:   a seizure (convulsions);   confusion, unusual changes in mood or behavior;   blurred vision, tunnel vision, eye pain or swelling, or seeing halos around lights;   fast or irregular heartbeats; or   a manic episode --racing thoughts, increased energy, reckless behavior, feeling extremely happy or irritable, talking more than usual, severe problems with sleep. Common side effects may include:   dry mouth, sore throat, stuffy nose;   ringing in the ears;   blurred vision;   nausea, vomiting, stomach pain, loss of appetite, constipation;   sleep problems (insomnia);   tremors, sweating, feeling anxious or nervous;   fast heartbeats;   confusion, agitation, hostility;   rash;   weight loss;   increased urination;   headache, dizziness; or   muscle or joint pain. This is not a complete list of side effects and others may occur. Call your doctor for medical advice about side effects. You may report side effects toFDA at 2-895-ZOQ-[phone]. What other drugs will affect bupropion? You may have a higher risk of seizures if you use certain other medicines while taking bupropion. Many drugs can affect bupropion. This includes prescription and over-the-counter medicines, vitamins, and herbal products. Not all possible interactions are listed here. Tell your doctor about all your current medicines and any medicine you startor stop using. Where can I get more information? Your pharmacist can provide more information about bupropion. Remember, keep this and all other medicines out of the reach of children, never share your medicines with others, and use this medication only for the indication prescribed.    Every effort has been made to ensure that the information provided by [address] is accurate, up-to-date, and complete, but no guarantee is made to that effect. Drug information contained herein may be time sensitive. [employer] information has been compiled for use by healthcare practitioners and consumers in the [place] and therefore [employer] does not warrant that uses outside of the [place] are appropriate, unless specifically indicated otherwise. [facility]'s drug information does not endorse drugs, diagnose patients or recommend therapy. [facility] drug information is an informational resource designed to assist licensed healthcare practitioners in caring for their patients and/or to serve consumers viewing this service as a supplement to, and not a substitute for, the expertise, skill, knowledge and judgment of healthcare practitioners. The absence of a warning for a given drug or drug combination in no way should be construed to indicate that the drug or drug combination is safe, effective or appropriate for any given patient. [facility] does not assume any responsibility for any aspect of healthcare administered with the aid of information [facility] provides. The information contained herein is not intended to cover all possible uses, directions, precautions, warnings, drug interactions, allergic reactions, or adverse effects. If you have questions about the drugs you are taking, check with yourdoctor, nurse or pharmacist.  Copyright 3664-3180 [address]. Version: 24.01. Revision date:1/27/2020. Care instructions adapted under license by [facility] ([facility]). If you have questions about a medical condition or this instruction, always ask your healthcare professional. [place] [address] any warranty or liability for your use of this information.

## 2022-04-11 NOTE — PROGRESS NOTES
Date of Service:  2022    Luis F Carr (:  1975) is a 42 y.o. male, here for evaluation of the following medical concerns:    Chief Complaint   Patient presents with    New Patient     Jana Bansal.     Hypertension     FOLLOW UP ON HTN    Ear Problem     PT C/O RINGING IN THE LEFT EAR, CONSTANT RINGING         HPI     Patient here today to establish care. Pt was previously seeing Dr Jose Elias Garcia and lives closer to this office and wants to be a better patient and better about health maintenance and this office can make that more convenient. Hypertension:  Home blood pressure monitoring: No, checks 1-2 times weekly at most, when he has checked 134/82. He is not adherent to a low sodium diet. Patient complains of tinnitus. Antihypertensive medication side effects: no medication side effects noted. Use of agents associated with hypertension: none. Pt has ringing in left ear, constant, developed headaches behind left eye around Christmastime and pt was prescribed verapamil, has not had headaches since then. 2 weeks ago ringing in ear left side started and it is \"driving me insane. \" Sleeps when he can and hates being awake. Constant large high pitched buzzing. Started on losartan 2 weeks ago when he saw Dr Tawanda Bermudez and was already having the left sided tinnitus at that time. Tried OTC ear drop/oil- no improvement. Tried another supplement- silencil. Had sinus congestion a few weeks ago, took OTC med and it improved but tinnitus remained. Had sinus surgery 7-8 years ago. No reported hearing loss. Works in sales. Does not wear head phones/ear buds regularly. Has not tried allergy medication since tinnitus started. No known fam hx of hearing loss. Denies any dizziness. Pt also reports some anxiety and difficulty sleeping at night. Pt has tried OTC melatonin and [facility] sleep aid. Has always struggled with sleep.  Thinks his own kids deal with anxiety and sleep issues also. Pt used to be more outgoing reportedly. Had struggles in college and feels he changed since then. Has never tried anything prescribed. Sodium (mEq/L)   Date Value   02/14/2012 145    BUN (mg/dl)   Date Value   02/14/2012 22 (H)    Glucose (mg/dl)   Date Value   02/14/2012 85      Potassium (mEq/L)   Date Value   02/14/2012 4.5    CREATININE (mg/dl)   Date Value   02/14/2012 0.8 (L)           Review of Systems   Constitutional: Negative for activity change, appetite change, fatigue, fever and unexpected weight change. HENT: Negative for congestion, ear pain, sinus pressure and sinus pain. Eyes: Negative for discharge and visual disturbance. Respiratory: Negative for cough, chest tightness and shortness of breath. Cardiovascular: Negative for chest pain, palpitations and leg swelling. Gastrointestinal: Negative for abdominal distention, abdominal pain, constipation, diarrhea and nausea. Endocrine: Negative for cold intolerance, heat intolerance, polydipsia, polyphagia and polyuria. Genitourinary: Negative for decreased urine volume, difficulty urinating, dysuria, flank pain, frequency and urgency. Musculoskeletal: Negative for arthralgias, back pain, gait problem, joint swelling, myalgias and neck pain. Skin: Negative for color change, rash and wound. Allergic/Immunologic: Negative for food allergies and immunocompromised state. Neurological: Negative for dizziness, tremors, speech difficulty, weakness, light-headedness, numbness and headaches. Hematological: Negative for adenopathy. Does not bruise/bleed easily. Psychiatric/Behavioral: Positive for sleep disturbance. Negative for confusion, decreased concentration, self-injury and suicidal ideas. The patient is nervous/anxious. Prior to Visit Medications    Medication Sig Taking?  Authorizing Provider   predniSONE (DELTASONE) 10 MG tablet Take 6 tabs day 1, 5 tabs day 2, 4 tabs day 3, 3 tabs day 4, 2 tabs day 5, 1 tab day 6 Yes WHITNEY Cleary CNP   losartan (COZAAR) 25 MG tablet Take 1 tablet by mouth daily Take one tablet daily Yes WHITNEY Cleary CNP   verapamil (CALAN) 120 MG tablet TAKE 2 TABLETS IN THE AM AND 1 IN THE PM Yes WHITNEY Cleary CNP        No Known Allergies    Past Medical History:   Diagnosis Date    Avascular necrosis ([address]     BOTH HIPS    COVID-19 2020    DJD (degenerative joint disease)     Fracture of ulna, left, closed     [address]    Fracture, tibia 2004    Sinus headache        Past Surgical History:   Procedure Laterality Date    OTHER SURGICAL HISTORY      L HIP ASEPTIC NECROSIS    SINUS SURGERY  2016    FESS    TOTAL HIP ARTHROPLASTY  2009    RIGHT     TOTAL HIP ARTHROPLASTY Left     WISDOM TOOTH EXTRACTION         Social History     Tobacco Use    Smoking status: Former Smoker     Years: 16.00     Types: Cigarettes     Quit date:      Years since quittin.2    Smokeless tobacco: Never Used    Tobacco comment: counseled on tobacco exposure avoidance  smokes 1/2 cigarette day   Substance Use Topics    Alcohol use: Yes     Alcohol/week: 0.0 standard drinks     Comment: socially    Drug use: No        Family History   Problem Relation Age of Onset    High Blood Pressure Mother     Cancer Father 59        COLON THAT MEASTASIZED TO LIVER/ LUNG        Vitals:    22 0849 22 0926   BP: 134/84 128/82   Site: Right Upper Arm    Position: Sitting    Cuff Size: Large Adult    Pulse: 80    Resp: 18    SpO2: 98%    Weight: 289 lb (131.1 kg)    Height: 6' 3\" (1.905 m)      Estimated body mass index is 36.12 kg/m² as calculated from the following:    Height as of this encounter: 6' 3\" (1.905 m). Weight as of this encounter: 289 lb (131.1 kg). Physical Exam  Vitals reviewed. Constitutional:       General: He is awake. Appearance: Normal appearance.  He is well-developed and well-groomed. He is obese. He is not ill-appearing or toxic-appearing. HENT:      Head: Normocephalic and atraumatic. Right Ear: Hearing, tympanic membrane, ear canal and external ear normal.      Left Ear: Hearing, tympanic membrane, ear canal and external ear normal.      Nose: Nose normal.      Mouth/Throat:      Lips: Pink. Mouth: Mucous membranes are moist.      Pharynx: Oropharynx is clear. Eyes:      General: Lids are normal.      Extraocular Movements: Extraocular movements intact. Conjunctiva/sclera: Conjunctivae normal.      Pupils: Pupils are equal, round, and reactive to light. Neck:      Thyroid: No thyromegaly. Vascular: No carotid bruit. Cardiovascular:      Rate and Rhythm: Normal rate. Pulses: Normal pulses. Carotid pulses are 2+ on the right side and 2+ on the left side. Radial pulses are 2+ on the right side and 2+ on the left side. Posterior tibial pulses are 2+ on the right side and 2+ on the left side. Heart sounds: Normal heart sounds, S1 normal and S2 normal. Heart sounds not distant. No murmur heard. Pulmonary:      Effort: Pulmonary effort is normal.      Breath sounds: Normal breath sounds. Chest:   Breasts:      Right: No supraclavicular adenopathy. Left: No supraclavicular adenopathy. Abdominal:      General: Bowel sounds are normal. There is no abdominal bruit. Palpations: Abdomen is soft. Tenderness: There is no abdominal tenderness. Genitourinary:     Comments: Deferred  Musculoskeletal:         General: Normal range of motion. Cervical back: Full passive range of motion without pain, normal range of motion and neck supple. Right lower leg: No edema. Left lower leg: No edema. Lymphadenopathy:      Head:      Right side of head: No submental, submandibular, tonsillar, preauricular, posterior auricular or occipital adenopathy.       Left side of head: No submental, submandibular, tonsillar, preauricular, posterior auricular or occipital adenopathy. Cervical: No cervical adenopathy. Right cervical: No superficial, deep or posterior cervical adenopathy. Left cervical: No superficial, deep or posterior cervical adenopathy. Upper Body:      Right upper body: No supraclavicular adenopathy. Left upper body: No supraclavicular adenopathy. Skin:     General: Skin is warm and dry. Capillary Refill: Capillary refill takes less than 2 seconds. Neurological:      General: No focal deficit present. Mental Status: He is alert and oriented to person, place, and time. Mental status is at baseline. Motor: Motor function is intact. No weakness. Coordination: Coordination is intact. Gait: Gait is intact. Psychiatric:         Attention and Perception: Attention and perception normal.         Mood and Affect: Affect normal. Mood is anxious. Speech: Speech normal.         Behavior: Behavior normal. Behavior is cooperative. Thought Content: Thought content normal.         Cognition and Memory: Cognition and memory normal.         Judgment: Judgment normal.         ASSESSMENT/PLAN:  1. Encounter to establish care   Discussed office policies/practices/provider team   Reviewed medical, social, family history and medications   MyChart activation and usage discussed  2. Left-sided tinnitus  -     predniSONE (DELTASONE) 10 MG tablet; Take 6 tabs day 1, 5 tabs day 2, 4 tabs day 3, 3 tabs day 4, 2 tabs day 5, 1 tab day 6, Disp-21 tablet, R-0Normal   Recommend OTC flonase 1-2 times daily and PO allergy medication daily for 3-4 days and then steroid if no improvement on allergy meds   Information printed and reviewed   Referral to ENT and audiology if persists despite allergy meds and steroids   Sudden onset 2 weeks ago  3. Essential hypertension  -     losartan (COZAAR) 25 MG tablet;  Take 1 tablet by mouth daily Take one tablet daily, Disp-90 tablet, R-3Normal  -     verapamil (CALAN) 120 MG tablet; TAKE 2 TABLETS IN THE AM AND 1 IN THE PM, Disp-270 tablet, R-3Normal   Follow a low sodium diet   Physical activity 150 minutes weekly recommended    Weight loss, initial goal 10% of body weight recommended   BP well controlled at visit today  4. Episodic cluster headache, not intractable  -     verapamil (CALAN) 120 MG tablet; TAKE 2 TABLETS IN THE AM AND 1 IN THE PM, Disp-270 tablet, R-3Normal   Stable, no headaches for months now  5. Screening for malignant neoplasm of colon  -     [facility] - Troy Ford MD, Gastroenterology, [facility]   Pt requested, fam hx colon cancer- dad  late 52s  7. Family history of colon cancer  -     [facility] - Troy Ford MD, Gastroenterology, [address] [place] [address] printed and reviewed  7. Psychophysiological insomnia   On melatonin or [facility] Z   Discussed healthy sleep habits   Difficulty sleeping now with tinnitus   Also snores, never had sleep study   Trazodone info in packet   Does not wake up feeling well rested  8. Anxiety    Pt has never been on meds   Difficulty falling asleep   Buspar, lexapro, prozac, wellbutrin info in packet    Care Gaps Addressed  Hep C screen recommended with next blood draw   HIV screen recommended  Diabetes screen overdue  Labs overdue  TDAP vaccine recommended- call insurance to discuss coverage  COVID booster recommended  Dad  of colon cancer- mid to late 46s    I have reviewed patient's pertinent medical history, relevant laboratory and imaging studies, and past/future health maintenance. Discussed with the patient the importance of adhering to their current medication regimen as directed. Advised the patient that they should continue to work on eating a healthy balanced diet and staying active by exercising within their personal limits. Orders as listed above.  Patient was advised to keep future appointments with their respective specialty care team(s). Patient had the opportunity to ask questions, all of which were answered to the best of my ability and with patient satisfaction. Patient understands and is agreeable with the care plan following today's visit. Patient is to schedule an appointment for any new or worsening symptoms. Go to ER for significant shortness of breath, chest pain, or uncontrolled pain or fever. I discussed with patient the risk and benefits of any medications that were prescribed today. I verified that the patient understands their medications, labs, and/or procedures. The patient is doing well with current medication regimen and does not have any barriers to adherence. The patient's self-management abilities are good. Return in about 1 month (around 5/11/2022) for Physical Exam and Labs, Anxiety Follow Up, HTN Follow Up. An  electronic signature was used to authenticate this note.     --WHITNEY Bangura - CNP on 4/11/2022 at 9:42 AM

## 2022-04-21 ENCOUNTER — PATIENT MESSAGE (OUTPATIENT)
Dept: FAMILY MEDICINE CLINIC | Age: 47
End: 2022-04-21

## 2022-04-21 DIAGNOSIS — H93.12 LEFT-SIDED TINNITUS: Primary | ICD-10-CM

## 2022-05-05 DIAGNOSIS — I10 ESSENTIAL HYPERTENSION: ICD-10-CM

## 2022-05-05 RX ORDER — LOSARTAN POTASSIUM 25 MG/1
TABLET ORAL
Qty: 30 TABLET | Refills: 0 | Status: SHIPPED | OUTPATIENT
Start: 2022-05-05 | End: 2022-05-13 | Stop reason: SDUPTHER

## 2022-05-06 DIAGNOSIS — H91.90 HEARING LOSS, UNSPECIFIED HEARING LOSS TYPE, UNSPECIFIED LATERALITY: Primary | ICD-10-CM

## 2022-05-10 NOTE — PROGRESS NOTES
Black Carbajal   1975, 42 y.o. male   [id number]       Referring Provider: Korey Brandt MD  Referral Type: In an order in [address] [place] [address]    Reason for Visit: Evaluation of suspected change in hearing, tinnitus, or balance. ADULT AUDIOLOGIC EVALUATION      Black Carbajal is a 42 y.o. male seen today, 5/11/2022 , for an initial audiologic evaluation. Patient was seen by Korey Brandt MD following today's evaluation. AUDIOLOGIC AND OTHER PERTINENT MEDICAL HISTORY:      Black Carbajal noted tinnitus. Patient reports keft tinnitus that is constant in nature. He notes this began about 2 months ago. Black Carbajal denied otalgia, aural fullness, otorrhea, dizziness, imbalance, history of falls, history of significant noise exposure, history of head trauma, history of ear surgery and family history of hearing loss. Date: 5/11/2022     IMPRESSIONS:      AU: Hearing WNL, Excellent WRS, Type A tymp    Test results consistent with hearing within normal limits. Discussed test results with patient. Patient to follow medical recommendations per Korey Brandt MD .    ASSESSMENT AND FINDINGS:     Otoscopy revealed: Clear ear canals bilaterally    RIGHT EAR:  Hearing Sensitivity: Normal hearing sensitivity   Speech Recognition Threshold: 5 dB HL  Word Recognition:Excellent (100%), based on NU-6 25-word list at 50 dBHL using recorded speech stimuli. Tympanometry: Normal peak pressure and compliance, Type A tympanogram, consistent with normal middle ear function. Acoustic Reflexes: Ipsilateral: Did not test. Contralateral: Did not test.    LEFT EAR:  Hearing Sensitivity: Normal hearing sensitivity   Speech Recognition Threshold: 5 dB HL  Word Recognition:Excellent (100%), based on NU-6 25-word list at 50 dBHL using recorded speech stimuli. Tympanometry: Normal peak pressure and compliance, Type A tympanogram, consistent with normal middle ear function.   Acoustic Reflexes: Ipsilateral: Did not test. Contralateral: Did not test.    Reliability: Good  Transducer: Inserts    See scanned audiogram dated 5/11/2022  for results. PATIENT EDUCATION:     The following items were discussed with the patient:   - Good Communication Strategies  - Tinnitus Management Strategies      Educational information was shared in the After Visit Summary. RECOMMENDATIONS:                                                                                                                                                                                                                                                          The following items are recommended based on patient report and results from today's appointment:   - Continue medical follow-up with Arlis Collet, MD.   - Retest hearing as medically indicated and/or sooner if a change in hearing is noted. - Utilize \"Good Communication Strategies\" as discussed to assist in speech understanding with communication partners. - Maintain a sound enriched environment to assist in the management of tinnitus symptoms.        David Acuna  Audiologist    Chart CC'd to: Arlis Collet, MD      Degree of   Hearing Sensitivity dB Range   Within Normal Limits (WNL) 0 - 20   Mild 20 - 40   Moderate 40 - 55   Moderately-Severe 55 - 70   Severe 70 - 90   Profound 90 +

## 2022-05-11 ENCOUNTER — OFFICE VISIT (OUTPATIENT)
Dept: FAMILY MEDICINE CLINIC | Age: 47
End: 2022-05-11
Payer: COMMERCIAL

## 2022-05-11 ENCOUNTER — PROCEDURE VISIT (OUTPATIENT)
Dept: AUDIOLOGY | Age: 47
End: 2022-05-11
Payer: COMMERCIAL

## 2022-05-11 ENCOUNTER — OFFICE VISIT (OUTPATIENT)
Dept: ENT CLINIC | Age: 47
End: 2022-05-11
Payer: COMMERCIAL

## 2022-05-11 VITALS
HEIGHT: 75 IN | DIASTOLIC BLOOD PRESSURE: 84 MMHG | RESPIRATION RATE: 20 BRPM | OXYGEN SATURATION: 98 % | SYSTOLIC BLOOD PRESSURE: 138 MMHG | HEART RATE: 76 BPM | BODY MASS INDEX: 35.93 KG/M2 | WEIGHT: 289 LBS

## 2022-05-11 VITALS
BODY MASS INDEX: 35.93 KG/M2 | HEART RATE: 79 BPM | DIASTOLIC BLOOD PRESSURE: 86 MMHG | WEIGHT: 289 LBS | SYSTOLIC BLOOD PRESSURE: 127 MMHG | HEIGHT: 75 IN

## 2022-05-11 DIAGNOSIS — Z13.220 LIPID SCREENING: ICD-10-CM

## 2022-05-11 DIAGNOSIS — Z00.00 WELL ADULT EXAM: Primary | ICD-10-CM

## 2022-05-11 DIAGNOSIS — G44.019 EPISODIC CLUSTER HEADACHE, NOT INTRACTABLE: ICD-10-CM

## 2022-05-11 DIAGNOSIS — R51.9 CHRONIC NONINTRACTABLE HEADACHE, UNSPECIFIED HEADACHE TYPE: ICD-10-CM

## 2022-05-11 DIAGNOSIS — Z11.59 ENCOUNTER FOR HEPATITIS C SCREENING TEST FOR LOW RISK PATIENT: ICD-10-CM

## 2022-05-11 DIAGNOSIS — F41.9 ANXIETY: ICD-10-CM

## 2022-05-11 DIAGNOSIS — I10 ESSENTIAL HYPERTENSION: ICD-10-CM

## 2022-05-11 DIAGNOSIS — R53.83 FATIGUE, UNSPECIFIED TYPE: ICD-10-CM

## 2022-05-11 DIAGNOSIS — H93.12 LEFT-SIDED TINNITUS: ICD-10-CM

## 2022-05-11 DIAGNOSIS — H93.12 TINNITUS OF LEFT EAR: Primary | ICD-10-CM

## 2022-05-11 DIAGNOSIS — E66.01 CLASS 2 SEVERE OBESITY DUE TO EXCESS CALORIES WITH SERIOUS COMORBIDITY AND BODY MASS INDEX (BMI) OF 36.0 TO 36.9 IN ADULT (HCC): ICD-10-CM

## 2022-05-11 DIAGNOSIS — G89.29 CHRONIC NONINTRACTABLE HEADACHE, UNSPECIFIED HEADACHE TYPE: ICD-10-CM

## 2022-05-11 DIAGNOSIS — F51.04 PSYCHOPHYSIOLOGICAL INSOMNIA: ICD-10-CM

## 2022-05-11 LAB
A/G RATIO: 1.9 (ref 1.1–2.2)
ALBUMIN SERPL-MCNC: 4.7 G/DL (ref 3.4–5)
ALP BLD-CCNC: 70 U/L (ref 40–129)
ALT SERPL-CCNC: 52 U/L (ref 10–40)
ANION GAP SERPL CALCULATED.3IONS-SCNC: 14 MMOL/L (ref 3–16)
AST SERPL-CCNC: 27 U/L (ref 15–37)
BILIRUB SERPL-MCNC: 0.4 MG/DL (ref 0–1)
BUN BLDV-MCNC: 19 MG/DL (ref 7–20)
CALCIUM SERPL-MCNC: 10.3 MG/DL (ref 8.3–10.6)
CHLORIDE BLD-SCNC: 106 MMOL/L (ref 99–110)
CHOLESTEROL, TOTAL: 228 MG/DL (ref 0–199)
CO2: 25 MMOL/L (ref 21–32)
CREAT SERPL-MCNC: 0.8 MG/DL (ref 0.9–1.3)
GFR AFRICAN AMERICAN: >60
GFR NON-AFRICAN AMERICAN: >60
GLUCOSE BLD-MCNC: 108 MG/DL (ref 70–99)
HDLC SERPL-MCNC: 67 MG/DL (ref 40–60)
HEPATITIS C ANTIBODY INTERPRETATION: NORMAL
LDL CHOLESTEROL CALCULATED: 134 MG/DL
POTASSIUM SERPL-SCNC: 4.8 MMOL/L (ref 3.5–5.1)
SODIUM BLD-SCNC: 145 MMOL/L (ref 136–145)
TOTAL PROTEIN: 7.2 G/DL (ref 6.4–8.2)
TRIGL SERPL-MCNC: 133 MG/DL (ref 0–150)
VITAMIN D 25-HYDROXY: 35.1 NG/ML
VLDLC SERPL CALC-MCNC: 27 MG/DL

## 2022-05-11 PROCEDURE — 99204 OFFICE O/P NEW MOD 45 MIN: CPT | Performed by: OTOLARYNGOLOGY

## 2022-05-11 PROCEDURE — 92557 COMPREHENSIVE HEARING TEST: CPT | Performed by: AUDIOLOGIST

## 2022-05-11 PROCEDURE — 99396 PREV VISIT EST AGE 40-64: CPT | Performed by: NURSE PRACTITIONER

## 2022-05-11 PROCEDURE — 92567 TYMPANOMETRY: CPT | Performed by: AUDIOLOGIST

## 2022-05-11 PROCEDURE — 36415 COLL VENOUS BLD VENIPUNCTURE: CPT | Performed by: NURSE PRACTITIONER

## 2022-05-11 RX ORDER — BUSPIRONE HYDROCHLORIDE 5 MG/1
5 TABLET ORAL 2 TIMES DAILY
Qty: 60 TABLET | Refills: 0 | Status: SHIPPED | OUTPATIENT
Start: 2022-05-11 | End: 2022-06-10 | Stop reason: SDUPTHER

## 2022-05-11 ASSESSMENT — ENCOUNTER SYMPTOMS
COUGH: 0
ABDOMINAL PAIN: 0
DIARRHEA: 0
SINUS PAIN: 0
BACK PAIN: 0
CHEST TIGHTNESS: 0
CONSTIPATION: 0
SINUS PRESSURE: 0
NAUSEA: 0
ABDOMINAL DISTENTION: 0
EYE DISCHARGE: 0
SHORTNESS OF BREATH: 0
COLOR CHANGE: 0

## 2022-05-11 NOTE — PROGRESS NOTES
[facility]      Patient Name: [address] [place] [address] Record Number:  [id number]  Primary Care Physician:  Juan Carlos Magallon, WHITNEY - CNP  Date of Consultation: 5/11/2022    Chief Complaint: Tinnitus        HISTORY OF PRESENT ILLNESS  Andrew Ramirez is a(n) 42 y.o. male who presents for evaluation of ringing in the left ear. The patient says that around March she started having ringing in the left ear. He does not really remember anything specifically that triggered this. He denies any head trauma. He says the ringing is always on the left side. He feels like it is always there and is fairly troublesome. He did start a new blood pressure medication not long prior to that. The reason he started the blood pressure medication was for reportedly cluster headaches. The headaches did go away. He has no significant ear history. He does not have significant noise exposure. He has never had ear surgery. He was recently diagnosed with anxiety and is actually going to start anxiety medication today. REVIEW OF SYSTEMS  As above    PHYSICAL EXAM  GENERAL: No Acute Distress, Alert and Oriented, no Hoarseness, strong voice  EYES: EOMI, Anti-icteric  HENT:   Head: Normocephalic and atraumatic. Face:  Symmetric, facial nerve intact, no sinus tenderness  Right Ear: Normal external ear, normal external auditory canal, intact tympanic membrane with normal mobility and aerated middle ear  Left Ear: Normal external ear, normal external auditory canal, intact tympanic membrane with normal mobility and aerated middle ear  Mouth/Oral Cavity:  normal lips, Uvula is midline, no mucosal lesions, no trismus  Oropharynx/Larynx:  normal oropharynx  Nose:Normal external nasal appearance.     NECK: Normal range of motion, no thyromegaly, trachea is midline, no lymphadenopathy, no neck masses, no crepitus      Patient got an audiogram today that shows normal hearing  ASSESSMENT/PLAN  1. Tinnitus of left ear  The patient's had 2 to 3 months of ringing in the left ear. He does not have an identifiable cause. I explained to him that there is really no magic pill or anything that can be used for tinnitus. We discussed masking techniques at night such as a fan. I told him that patients with anxiety often experience significantly worse ringing in the ear. He is reportedly just about to start an anxiety medication. I think the first step should be to see whether or not getting the anxiety under control helps with the ringing. If it does not I do think is reasonable to see whether or not changing the verapamil would help. 2. Chronic nonintractable headache, unspecified headache type  The patient had what sounds like cluster headaches on the same side. Ordinarily I would not recommend imaging as he does not have other symptoms, but I did offer an MRI of the brain with and without contrast or a neurology referral.  I am not positive what we would be looking for that would cause something like a cluster headache and also the ringing, but I think it is reasonable. He said he would discuss this with his primary care doctor first    3. Anxiety  Again I think the first step would be to see whether or not the anxiety medication helps. I have performed a head and neck physical exam personally or was physically present during the key or critical portions of the service. This note was generated completely or in part utilizing Dragon dictation speech recognition software. Occasionally, words are mistranscribed and despite editing, the text may contain inaccuracies due to incorrect word recognition. If further clarification is needed please contact the office at [phone].

## 2022-05-11 NOTE — PROGRESS NOTES
History and Physical      Naun Dotson  YOB: 1975    Date of Service:  5/11/2022    Chief Complaint:   Naun Dotson is a 42 y.o. male who presents for complete physical examination. Chief Complaint   Patient presents with    Annual Exam     PT HERE FOR ANNUAL EXAM AND Olivia Larsen         HPI:    Patient here today for physical and fasting labs. Pt still having ringing in ears, possibly could be from his verapamil. Seeing ENT/audiology today. Verapamil new since January 2022, tinnitus started end of March 2022. Tried flonase, no improvement. Anxiety and sleep issues especially with tinnitus. Previously was thought by internal medicine PCP prior to me that it could be secondary to elevated blood pressure so his verapamil was increased and losartan added. Pt has tried allergy medication and steroids and sees no difference. Sees ENT today, will consider changing BP medications and pt willing to try without verapamil for headaches if this could be cause of his tinnitus. Still having some difficulty falling asleep, worse with tinnitus. Taking OTC sleeping pill that helps.      Wt Readings from Last 3 Encounters:   05/11/22 289 lb (131.1 kg)   04/11/22 289 lb (131.1 kg)   03/30/22 288 lb (130.6 kg)     BP Readings from Last 3 Encounters:   05/11/22 138/84   04/11/22 128/82   03/30/22 (!) 142/100       Patient Active Problem List   Diagnosis    Tobacco abuse-(advised to quit)    Aseptic necrosis of head and neck of femur    FH: colon cancer-(father at 61yo)    FH: CAD (coronary artery disease)-(father in 50s--discussed risk factor reduction)    Allergic rhinitis    Psychophysiological insomnia    Essential hypertension    Episodic cluster headache, not intractable    Anxiety       No Known Allergies  Outpatient Medications Marked as Taking for the 5/11/22 encounter (Office Visit) with WHITNEY Herbert - CNP   Medication Sig Dispense Refill    busPIRone (BUSPAR) 5 MG tablet Take 1 tablet by mouth 2 times daily 60 tablet 0    losartan (COZAAR) 25 MG tablet TAKE 1 TABLET BY MOUTH DAILY AS NEEDED FOR BLOOD PRESSURE 30 tablet 0    verapamil (CALAN) 120 MG tablet TAKE 2 TABLETS IN THE AM AND 1 IN THE  tablet 3       Past Medical History:   Diagnosis Date    Avascular necrosis ([address]     BOTH HIPS    COVID-19 2020    DJD (degenerative joint disease)     Fracture of ulna, left, closed     LEFT  AND RIGHT ULNAR 1995    Fracture, tibia 2004    Sinus headache      Past Surgical History:   Procedure Laterality Date    OTHER SURGICAL HISTORY      L HIP ASEPTIC NECROSIS    SINUS SURGERY  2016    FESS    TOTAL HIP ARTHROPLASTY  2009    RIGHT     TOTAL HIP ARTHROPLASTY Left     WISDOM TOOTH EXTRACTION       Family History   Problem Relation Age of Onset    High Blood Pressure Mother     Cancer Father 59        COLON THAT MEASTASIZED TO LIVER/ LUNG      Social History     Socioeconomic History    Marital status:      Spouse name: Mami Inman Number of children: 1    Years of education: Not on file    Highest education level: Not on file   Occupational History    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      Comment: [employer]   Tobacco Use    Smoking status: Former Smoker     Years: 16.00     Types: Cigarettes     Quit date:      Years since quittin.3    Smokeless tobacco: Never Used    Tobacco comment: counseled on tobacco exposure avoidance  smokes 1/2 cigarette day   Substance and Sexual Activity    Alcohol use:  Yes     Alcohol/week: 0.0 standard drinks     Comment: socially    Drug use: No    Sexual activity: Not on file   Other Topics Concern    Not on file   Social History Narrative    Not on file     Social Determinants of Health     Financial Resource Strain:     Difficulty of Paying Living Expenses: Not on file   Food Insecurity:     Worried About Running Out of Food in the Last Year: Not on file    Ran Out of Food in the Last Year: Not on file   Transportation Needs:     Lack of Transportation (Medical): Not on file    Lack of Transportation (Non-Medical): Not on file   Physical Activity:     Days of Exercise per Week: Not on file    Minutes of Exercise per Session: Not on file   Stress:     Feeling of Stress : Not on file   Social Connections:     Frequency of Communication with Friends and Family: Not on file    Frequency of Social Gatherings with Friends and Family: Not on file    Attends Latter day Services: Not on file    Active Member of [address] [place] [address] or Organizations: Not on file    Attends Club or Organization Meetings: Not on file    Marital Status: Not on file   Intimate Partner Violence:     Fear of Current or Ex-Partner: Not on file    Emotionally Abused: Not on file    Physically Abused: Not on file    Sexually Abused: Not on file   Housing Stability:     Unable to Pay for Housing in the Last Year: Not on file    Number of [place] in the Last Year: Not on file    Unstable Housing in the Last Year: Not on file       Review of Systems:  Review of Systems   Constitutional: Negative for activity change, appetite change, fatigue, fever and unexpected weight change. HENT: Positive for tinnitus (left side only). Negative for congestion, ear pain, sinus pressure and sinus pain. Eyes: Negative for discharge and visual disturbance. Respiratory: Negative for cough, chest tightness and shortness of breath. Cardiovascular: Negative for chest pain, palpitations and leg swelling. Gastrointestinal: Negative for abdominal distention, abdominal pain, constipation, diarrhea and nausea. Endocrine: Negative for cold intolerance, heat intolerance, polydipsia, polyphagia and polyuria. Genitourinary: Negative for decreased urine volume, difficulty urinating, dysuria, flank pain, frequency and urgency.    Musculoskeletal: Negative for arthralgias, back pain, gait problem, joint swelling, myalgias and neck pain. Skin: Negative for color change, rash and wound. Allergic/Immunologic: Negative for food allergies and immunocompromised state. Neurological: Negative for dizziness, tremors, speech difficulty, weakness, light-headedness, numbness and headaches. Hematological: Negative for adenopathy. Does not bruise/bleed easily. Psychiatric/Behavioral: Positive for sleep disturbance. Negative for confusion, decreased concentration, self-injury and suicidal ideas. The patient is nervous/anxious. Physical Exam:   Vitals:    05/11/22 0836   BP: 138/84   Site: Right Upper Arm   Position: Sitting   Cuff Size: Large Adult   Pulse: 76   Resp: 20   SpO2: 98%   Weight: 289 lb (131.1 kg)   Height: 6' 3\" (1.905 m)     Body mass index is 36.12 kg/m². Physical Exam  Vitals reviewed. Constitutional:       General: He is awake. Appearance: Normal appearance. He is well-developed and well-groomed. He is obese. He is not ill-appearing or toxic-appearing. HENT:      Head: Normocephalic and atraumatic. Right Ear: Hearing, tympanic membrane, ear canal and external ear normal.      Left Ear: Hearing, tympanic membrane, ear canal and external ear normal.      Nose: Nose normal.      Mouth/Throat:      Lips: Pink. Mouth: Mucous membranes are moist.      Pharynx: Oropharynx is clear. Eyes:      General: Lids are normal.      Extraocular Movements: Extraocular movements intact. Conjunctiva/sclera: Conjunctivae normal.      Pupils: Pupils are equal, round, and reactive to light. Neck:      Thyroid: No thyromegaly. Vascular: No carotid bruit. Cardiovascular:      Rate and Rhythm: Normal rate. Pulses: Normal pulses. Carotid pulses are 2+ on the right side and 2+ on the left side. Radial pulses are 2+ on the right side and 2+ on the left side. Posterior tibial pulses are 2+ on the right side and 2+ on the left side.       Heart sounds: Normal heart sounds, S1 normal and S2 normal. Heart sounds not distant. No murmur heard. Pulmonary:      Effort: Pulmonary effort is normal.      Breath sounds: Normal breath sounds. Chest:   Breasts:      Right: No supraclavicular adenopathy. Left: No supraclavicular adenopathy. Abdominal:      General: Bowel sounds are normal. There is no abdominal bruit. Palpations: Abdomen is soft. Tenderness: There is no abdominal tenderness. Genitourinary:     Comments: Deferred  Musculoskeletal:         General: Normal range of motion. Cervical back: Full passive range of motion without pain, normal range of motion and neck supple. Right lower leg: No edema. Left lower leg: No edema. Lymphadenopathy:      Head:      Right side of head: No submental, submandibular, tonsillar, preauricular, posterior auricular or occipital adenopathy. Left side of head: No submental, submandibular, tonsillar, preauricular, posterior auricular or occipital adenopathy. Cervical: No cervical adenopathy. Right cervical: No superficial, deep or posterior cervical adenopathy. Left cervical: No superficial, deep or posterior cervical adenopathy. Upper Body:      Right upper body: No supraclavicular adenopathy. Left upper body: No supraclavicular adenopathy. Skin:     General: Skin is warm and dry. Capillary Refill: Capillary refill takes less than 2 seconds. Neurological:      General: No focal deficit present. Mental Status: He is alert and oriented to person, place, and time. Mental status is at baseline. Motor: Motor function is intact. No weakness. Coordination: Coordination is intact. Gait: Gait is intact. Psychiatric:         Attention and Perception: Attention and perception normal.         Mood and Affect: Mood and affect normal.         Speech: Speech normal.         Behavior: Behavior normal. Behavior is cooperative. Thought Content:  Thought content normal.         Cognition and Memory: Cognition and memory normal.         Judgment: Judgment normal.          Preventive Care:  Health Maintenance Due   Topic Date Due    Hepatitis C screen  Never done    Diabetes screen  Never done    Lipids  Never done    DTaP/Tdap/Td vaccine (2 - Td or Tdap) 05/18/2019    COVID-19 Vaccine (3 - Booster for Pfizer series) 09/09/2021       Self-testicular exams: No  Sexual activity: single partner, contraception - vasectomy   Last eye exam: years ago, recommended  Exercise: walks 2 time(s) per week  Seatbelt use: Yes       Preventive plan of care for Charles Bolds        5/11/2022           Preventive Measures Status       Recommendations for screening   Prostate Cancer Screen  No results found for: PSA      No fam hx prostate cancer This test is not clinically indicated    Colon Cancer Screen  Last colonoscopy: NA, + fam hx colon cancer dad Already been given referral   Diabetes Screen  Glucose (mg/dl)   Date Value   02/14/2012 85    Test recommended and ordered   Cholesterol Screen  No results found for: CHOL, TRIG, HDL, LDLCALC, LDLDIRECT Test recommended and ordered   Aspirin for Cardiovascular Prevention   No Not indicated   Weight: Body mass index is 36.12 kg/m².   6' 3\" (1.905 m)289 lb (131.1 kg)  Your BMI is 25 or greater, which indicates that you are overweight   Living Will: Yes Copy requested    Recommended Immunizations    Immunization History   Administered Date(s) Administered    COVID-19, Pfizer Purple top, DILUTE for use, 12+ yrs, 30mcg/0.3mL dose 03/19/2021, 04/09/2021    Influenza Virus Vaccine 02/11/2010, 11/16/2017    Td vaccine (adult) 05/18/2009    Tdap (Boostrix, Adacel) 05/18/2009    See care gaps addressed below              Other Recommendations ·   Follow up in this office in 1 year(s) for further evaluation and treatment of health and more often with adjustments in blood pressure medications  · See an eye specialist every 2 years  · See a dentist every 6 months  · You should limit alcohol use to no more than 2 drinks/day (beer included) or abstain completely  · Try to get at least 30 minutes of exercise 3-4 days per week  · Always wear a seat belt when traveling in a car  · Always wear a helmet when riding a bicycle or motorcycle  · When exposed to the sun, use a sunscreen that protects against both UVA and UVB radiation with an SPF of 30 or greater- reapply every 2 to 3 hours or after sweating, drying off with a towel, or swimming  · You need 9029-2072 mg of calcium and 5643-6029 IU of vitamin D per day- it is possible to meet your calcium requirement with diet alone, but a vitamin D supplement is usually necessary  · Have your blood pressure checked at least once every year             Assessment/Plan:  1. Well adult exam  2. Essential hypertension  -     Comprehensive Metabolic Panel; Future  -     Lipid Panel; Future   Losartan 25 mg daily, continue this dose unless we stop verapamil s/t concerns for cause of tinnitus, then will increase losartan to 100 mg daily   Follow a low sodium diet   Physical activity 150 minutes weekly recommended    Weight loss, initial goal 10% of body weight recommended  3. Left-sided tinnitus   Seeing ENT today, will review note once complete and then decide on next course and call pt to discuss plan of care   ENT and audiology visits scheduled for today  4. Episodic cluster headache, not intractable   On verapamil   Consider stopping medication if ENT believes this could be the cause of tinnitus  5. Psychophysiological insomnia   Improving on OTC meds   Likely can improve with anxiety control and stopping tinnitus  6. Anxiety  -     busPIRone (BUSPAR) 5 MG tablet;  Take 1 tablet by mouth 2 times daily, Disp-60 tablet, R-0Normal   Pt agreeable to start medication, does not feel he can work through on own   Stress with job   Information printed and reviewed   [address] [employer] message in 4 weeks to report how he is feeling on dose, increase at that time if helping but not enough  7. Encounter for hepatitis C screening test for low risk patient  -     Hepatitis C Antibody; Future  8. Fatigue, unspecified type  -     Vitamin D 25 Hydroxy; Future   Vitamin D helps with immune support, bone health, and energy levels. 9. Class 2 severe obesity due to excess calories with serious comorbidity and body mass index (BMI) of 36.0 to 36.9 in adult [facility])  -     Lipid Panel; Future   Work on limiting saturated fats in diet, and eating a healthy balance of fruits, vegetables, lean proteins, and multigrains. Physical activity 150 minutes weekly recommended    Weight loss, initial goal 10% of body weight recommended  10. Lipid screening  -     Lipid Panel; Future      Care Gaps Addressed  HIV screen declined  Hep C screen recommended with next blood draw   Diabetes screen today  TDAP vaccine recommended- call insurance to discuss coverage  COVID booster recommended    I have reviewed patient's pertinent medical history, relevant laboratory and imaging studies, and past/future health maintenance. Discussed with the patient the importance of adhering to their current medication regimen as directed. Advised the patient that they should continue to work on eating a healthy balanced diet and staying active by exercising within their personal limits. Orders as listed above. Patient was advised to keep future appointments with their respective specialty care team(s). Patient had the opportunity to ask questions, all of which were answered to the best of my ability and with patient satisfaction. Patient understands and is agreeable with the care plan following today's visit. Patient is to schedule an appointment for any new or worsening symptoms. Go to ER for significant shortness of breath, chest pain, or uncontrolled pain or fever. I discussed with patient the risk and benefits of any medications that were prescribed today.  I verified that the patient understands their medications, labs, and/or procedures. The patient is doing well with current medication regimen and does not have any barriers to adherence. The patient's self-management abilities are good. Follow Up in 1 Months for HTN if med adjustments or 3 months if checking in on anxiety only and no HTN med adjustments and Annual Physical Exam with Fasting Labs Yearly      Addendum-  Reviewed audiology and Dr Laney Jon note from today following their visit. Tried calling pt to discuss plan of care but no answer. Left voicemail. While Dr Laney Jon recommends working on getting anxiety under control to see if tinnitus goes away, I think it would be reasonable to stop the verapamil as controlling anxiety can take time with medication adjustments and pt, despite always having some anxiety, only developed the tinnitus 2 months after starting the verapamil with no prior hx of tinnitus. Will attempt to call patient again on my next work day. Recommendation- stop verapamil. Start buspar 5 mg BID. Increase losartan to 100 mg daily.

## 2022-05-11 NOTE — PATIENT INSTRUCTIONS
Await ENT office note but if no specific cause found for tinnitus- will stop verapamil and increase losartan to 100 mg daily      ·   ·   · Follow up in this office in 1 year(s) for further evaluation and treatment of health and more often with adjustments in blood pressure medications  · See an eye specialist every 2 years  · See a dentist every 6 months  · You should limit alcohol use to no more than 2 drinks/day (beer included) or abstain completely  · Try to get at least 30 minutes of exercise 3-4 days per week  · Always wear a seat belt when traveling in a car  · Always wear a helmet when riding a bicycle or motorcycle  · When exposed to the sun, use a sunscreen that protects against both UVA and UVB radiation with an SPF of 30 or greater- reapply every 2 to 3 hours or after sweating, drying off with a towel, or swimming  · You need 3169-3953 mg of calcium and 8033-6719 IU of vitamin D per day- it is possible to meet your calcium requirement with diet alone, but a vitamin D supplement is usually necessary  · Have your blood pressure checked at least once every year

## 2022-05-11 NOTE — Clinical Note
Dr. Hannah Khalil,    Please see note from this patient's audiogram from today. Please let me know if there is anything further you need.         David Harris  Audiologist

## 2022-05-11 NOTE — PATIENT INSTRUCTIONS
Tinnitus: Overview and Management Strategies          Many people have some ringing sounds in their ears once in a while. You may hear a roar, a hiss, a tinkle, or a buzz. The sound usually lasts only a few minutes. If it goes on all the time, you may have tinnitus. Tinnitus is usually caused by long-term exposure to loud noise. This damages the nerves in the inner ear. It can occur with all types of hearing loss. It may be a symptom of almost any ear problem. Tinnitus may be caused by a buildup of earwax. Or, it may be caused by ear infections or certain medicines (especially antibiotics or large amounts of aspirin). You can also hear noises in your ears because of an injury to the ears, drinking too much alcohol or caffeine, or a medical condition. Other conditions may also contribute to tinnitus, including: head and neck trauma, temporomandibular joint disorder (TMJ), sinus pressure and barometric trauma, traumatic brain injury, metabolic disorders, autoimmune disorders, stress, and high blood pressure. You may need tests to evaluate your hearing and to find causes of long-lasting tinnitus. Your doctor may suggest one or more treatments to help you cope with the tinnitus. You can also do things at home to help reduce symptoms. Causes of Tinnitus  We do not know the exact cause of tinnitus. One thing we do know is that you are not imagining it. If you have tinnitus, chances are the cause will remain a mystery. Conditions that might cause tinnitus include the following:    Hearing loss    Ménière's disease    Loud noise exposure    Migraines    Head injury    Drugs or medicines that are toxic to hearing    Anemia    High blood pressure    Stress    A lot of wax in the ear    Certain types of tumors    Having a lot of caffeine    Smoking cigarettes  You may find that your tinnitus is worse at night. This happens because it is quiet and you are not distracted.  Feeling tired and stressed may make your tinnitus worse. Follow-up care is a key part of your treatment and safety. Be sure to make and go to all appointments, and call your doctor if you are having problems. It's also a good idea to know your test results and keep a list of the medicines you take. How can you care for yourself at home? · Limit or cut out alcohol, caffeine, and sodium. They can make your symptoms worse. · Do not smoke or use other tobacco products. Nicotine reduces blood flow to the ear and makes tinnitus worse. If you need help quitting, talk to your doctor about stop-smoking programs and medicines. These can increase your chances of quitting for good. · Talk to your doctor about whether to stop taking aspirin and similar products such as ibuprofen or naproxen. · Get exercise often. It can help improve blood flow to the ear. Hearing Aids and Other Devices  A hearing aid may help your tinnitus if you have a hearing loss. An audiologist can help you find and use the best hearing aid for you. Tinnitus maskers look like hearing aids. They make a sound that masks, or covers up, the tinnitus. The masking sound distracts you from the ringing in your ears. You may be able to use a masker and a hearing aid at the same time. Sound machines can be useful at night or during quiet times. There are machines you can buy at the store. Or, you can find apps on your phone that make sounds, like the ocean or rainfall. Fish tanks, fans, quiet music, and indoor waterfalls can help, as well. Ways to manage/cope with tinnitus  Some tinnitus may last a long time. To manage your tinnitus, try to:  · Avoid noises that you think caused your tinnitus. If you can't avoid loud noises, wear earplugs or earmuffs. · Ignore the sound by paying attention to other things. Keeping your brain busy with other tasks or background noise can help your brain not focus on the tinnitus. · Try to not give the tinnitus an emotional reaction.   Do your best to ignore the sound and not let it bother you. Relax using biofeedback, meditation, or yoga. Feeling stressed and being tired can make tinnitus worse. · Play music or white noise to help you sleep. Background noise may cover up the noise that you hear in your ears. You can buy a tabletop machine or a device that sits under your pillow to play soothing sounds, like ocean waves. · Smart phones have free apps, such as Whist, Relax Melodies, ReSound Relief, and White Noise Lite. These apps have different types of sounds/noise, some of which you can blend together to find sounds that are most soothing to you. · Hearing aid technology, especially when there is some hearing loss, may help reduce tinnitus symptoms by giving your brain better access to the sounds it is missing. There are some hearing aids with built-in noise generator programs, which may help when amplification alone is not enough. Additional resources may be found through the [employer]inn[employer] at www.sirena.org    When should you call for help? Call 911 anytime you think you may need emergency care. For example, call if:    · You have symptoms of a stroke. These may include:  ? Sudden numbness, tingling, weakness, or loss of movement in your face, arm, or leg, especially on only one side of your body. ? Sudden vision changes. ? Sudden trouble speaking. ? Sudden confusion or trouble understanding simple statements. ? Sudden problems with walking or balance. ? A sudden, severe headache that is different from past headaches. Call your doctor now or seek immediate medical care if:    · You develop other symptoms. These may include hearing loss (or worse hearing loss), balance problems, dizziness, nausea, or vomiting. Watch closely for changes in your health, and be sure to contact your doctor if:    · Your tinnitus moves from both ears to one ear. · Your hearing loss gets worse within 1 day after an ear injury. · Your tinnitus or hearing loss does not get better within 1 week after an ear injury. · Your tinnitus bothers you enough that you want to take medicines to help you cope with it. If you notice changes in your tinnitus and/or your hearing, it is recommended that you have your hearing tested by your audiologist and to follow-up with your physician that manages your hearing loss (such as your ENT or Primary Care doctor). Tinnitus Apps: The following are tinnitus applications created by hearing aid companies. They play environmental sounds that can help to reduce the perception of tinnitus.         Mikhail Relax                         Resound Tinnitus Relief        Phonak Tinnitus Balance

## 2022-05-13 ENCOUNTER — TELEPHONE (OUTPATIENT)
Dept: FAMILY MEDICINE CLINIC | Age: 47
End: 2022-05-13

## 2022-05-13 DIAGNOSIS — I10 ESSENTIAL HYPERTENSION: ICD-10-CM

## 2022-05-13 RX ORDER — LOSARTAN POTASSIUM 100 MG/1
TABLET ORAL
Qty: 30 TABLET | Refills: 0 | Status: SHIPPED | OUTPATIENT
Start: 2022-05-13 | End: 2022-06-06 | Stop reason: SDUPTHER

## 2022-05-13 NOTE — TELEPHONE ENCOUNTER
Called patient and discussed ENT visit and tinnitus. Recommend stop verapamil. Increase losartan to 100 mg daily to keep BP from being elevated since verapamil was also being used for HTN as well. Follow up in 1 month-   Goals- stop tinnitus, controlled BP, and better anxiety management and no return of headaches with all these medication adjustments. Pt also reports new sinus symptoms that started yesterday- green milky sinus nasal drainage and head feels full. Recommend treating symptomically for now- flonase, sinus rinse, hot showers, sudafed, mucinex. If persists x 7-10 days, send MyChart message and I will send abx; pt verbalized understanding.

## 2022-06-06 ENCOUNTER — PATIENT MESSAGE (OUTPATIENT)
Dept: FAMILY MEDICINE CLINIC | Age: 47
End: 2022-06-06

## 2022-06-06 DIAGNOSIS — I10 ESSENTIAL HYPERTENSION: ICD-10-CM

## 2022-06-06 RX ORDER — LOSARTAN POTASSIUM 100 MG/1
TABLET ORAL
Qty: 90 TABLET | Refills: 0 | Status: SHIPPED | OUTPATIENT
Start: 2022-06-06 | End: 2022-06-10 | Stop reason: SDUPTHER

## 2022-06-06 NOTE — TELEPHONE ENCOUNTER
From: Luis F Carr  To: Elba Osman  Sent: 6/6/2022 8:52 AM EDT  Subject: [id number] Nadine Locke,    After I went to Audiologist for my Tinnitus we thought I should get off my Verapamil Rx for Blood Pressure and increase my dossage of Losartin, which I have been doing. Unfortunately I am now out of the [address] [place] [address]. I thought you had called in a new Rx of Losartin, but never heard from [facility]. I just called and they do not have a record of it. the Tinnitus is not getting any better and my blood pressure has been awfully high lately. Should I go back on Verapamil, or continue with Losartin (I would need more called in) and wait to meet with you on Friday at my next appointment.     Thank you for all you help

## 2022-06-10 ENCOUNTER — OFFICE VISIT (OUTPATIENT)
Dept: FAMILY MEDICINE CLINIC | Age: 47
End: 2022-06-10
Payer: COMMERCIAL

## 2022-06-10 VITALS
OXYGEN SATURATION: 96 % | BODY MASS INDEX: 35.43 KG/M2 | WEIGHT: 285 LBS | SYSTOLIC BLOOD PRESSURE: 122 MMHG | DIASTOLIC BLOOD PRESSURE: 84 MMHG | HEART RATE: 73 BPM | HEIGHT: 75 IN | RESPIRATION RATE: 20 BRPM

## 2022-06-10 DIAGNOSIS — R05.9 COUGH: ICD-10-CM

## 2022-06-10 DIAGNOSIS — J01.40 ACUTE NON-RECURRENT PANSINUSITIS: ICD-10-CM

## 2022-06-10 DIAGNOSIS — I10 ESSENTIAL HYPERTENSION: ICD-10-CM

## 2022-06-10 DIAGNOSIS — R73.01 IMPAIRED FASTING GLUCOSE: ICD-10-CM

## 2022-06-10 DIAGNOSIS — H93.12 LEFT-SIDED TINNITUS: Primary | ICD-10-CM

## 2022-06-10 DIAGNOSIS — F41.9 ANXIETY: ICD-10-CM

## 2022-06-10 LAB — HBA1C MFR BLD: 5.3 %

## 2022-06-10 PROCEDURE — 99214 OFFICE O/P EST MOD 30 MIN: CPT | Performed by: NURSE PRACTITIONER

## 2022-06-10 PROCEDURE — 83036 HEMOGLOBIN GLYCOSYLATED A1C: CPT | Performed by: NURSE PRACTITIONER

## 2022-06-10 RX ORDER — BENZONATATE 100 MG/1
100 CAPSULE ORAL 3 TIMES DAILY PRN
Qty: 30 CAPSULE | Refills: 0 | Status: SHIPPED | OUTPATIENT
Start: 2022-06-10 | End: 2022-06-17

## 2022-06-10 RX ORDER — DOXYCYCLINE HYCLATE 100 MG
100 TABLET ORAL 2 TIMES DAILY
Qty: 20 TABLET | Refills: 0 | Status: SHIPPED | OUTPATIENT
Start: 2022-06-10 | End: 2022-06-20

## 2022-06-10 RX ORDER — METHYLPREDNISOLONE 4 MG/1
TABLET ORAL
Qty: 1 KIT | Refills: 0 | Status: SHIPPED | OUTPATIENT
Start: 2022-06-10 | End: 2022-06-16

## 2022-06-10 RX ORDER — BUSPIRONE HYDROCHLORIDE 10 MG/1
10 TABLET ORAL 2 TIMES DAILY
Qty: 180 TABLET | Refills: 0 | Status: SHIPPED | OUTPATIENT
Start: 2022-06-10 | End: 2022-08-15 | Stop reason: SDUPTHER

## 2022-06-10 RX ORDER — LOSARTAN POTASSIUM 100 MG/1
TABLET ORAL
Qty: 90 TABLET | Refills: 0 | Status: SHIPPED | OUTPATIENT
Start: 2022-06-10 | End: 2022-08-15 | Stop reason: SDUPTHER

## 2022-06-10 ASSESSMENT — ENCOUNTER SYMPTOMS
ABDOMINAL DISTENTION: 0
NAUSEA: 0
ABDOMINAL PAIN: 0
SINUS PRESSURE: 0
CONSTIPATION: 0
EYE DISCHARGE: 0
DIARRHEA: 0
CHEST TIGHTNESS: 0
COLOR CHANGE: 0
SINUS PAIN: 0
COUGH: 0
SHORTNESS OF BREATH: 0
BACK PAIN: 0

## 2022-06-10 NOTE — PROGRESS NOTES
Date of Service:  6/10/2022    Ivan Bolton (:  1975) is a 41 y.o. male, here for evaluation of the following medical concerns:    Chief Complaint   Patient presents with    Hypertension     follow up on htn    Anxiety     follow up on anxiety        HPI     Upper Respiratory Infection  Patient complains of symptoms of a URI. Symptoms include cough, post nasal drip. Onset of symptoms was 4 weeks ago, unchanged since that time. He also c/o maybe low grade fever  for the past 1-2 weeks ago . He is drinking plenty of fluids. Evaluation to date: none. Treatment to date: allergy meds, sudafed, tylenol cold and flu, advil cold and sinus, mucinex. Youngest son diagnosed with strep yesterday. Last abx 2022 and Dec 2022. This was also the time the tinnitus started, left side only. NO change in tinnitus. Hypertension:  Home blood pressure monitoring: No, very high readings at home but pt doesn't trust home cuff. He is not adherent to a low sodium diet. Patient complains of tinnitus. Antihypertensive medication side effects: no medication side effects noted. Use of agents associated with hypertension: decongestants. Sodium (mmol/L)   Date Value   2022 145    BUN (mg/dL)   Date Value   2022 19    Glucose (mg/dL)   Date Value   2022 108 (H)      Potassium (mmol/L)   Date Value   2022 4.8    CREATININE (mg/dL)   Date Value   2022 0.8 (L)         Anxiety/Tinnitus  Pt started on buspar 5 mg BID. Doesn't notice much difference. Having some depression because of the ringing in his ear being constant and \"driving him crazy. \" Pt already uses flonase regularly. Pt just says the ringing in the ear is all he can think about. Pt has dealt with anxiety for years. Pt saw ENT last month. Maybe slight hearing loss but nothing causing the tinnitus. Thought maybe anxiety could be causing tinnitus or at least making it worse. Reviewed ENT note. Pt had CT scan in 2016- had sinus surgery around that time. Stopped the verapamil, pt was on this for HTN and headaches. Stopped verapamil to see if this was the cause of the tinnitus but after 1 month off verapamil, no change in tinnitus, but also pt not having cluster headaches so no need to restart at this time. The ENT could not find an identifiable cause of the tinnitus. ENT considered MRI brain or neurology referral but unsure this would be the cause of cluter headaches or ringing in ears but felt like it was a reasonable step. Pt wanted to discuss here first. Recommend CT scan first since last one was in 2016. NO change in tinnitus with buspar anxiety medication, ENT was hopeful better control of anxiety would help tinnitus. Review of Systems   Constitutional: Negative for activity change, appetite change, fatigue, fever and unexpected weight change. HENT: Positive for tinnitus (left). Negative for congestion, ear pain, sinus pressure and sinus pain. Eyes: Negative for discharge and visual disturbance. Respiratory: Negative for cough, chest tightness and shortness of breath. Cardiovascular: Negative for chest pain, palpitations and leg swelling. Gastrointestinal: Negative for abdominal distention, abdominal pain, constipation, diarrhea and nausea. Endocrine: Negative for cold intolerance, heat intolerance, polydipsia, polyphagia and polyuria. Genitourinary: Negative for decreased urine volume, difficulty urinating, dysuria, flank pain, frequency and urgency. Musculoskeletal: Negative for arthralgias, back pain, gait problem, joint swelling, myalgias and neck pain. Skin: Negative for color change, rash and wound. Allergic/Immunologic: Negative for food allergies and immunocompromised state. Neurological: Negative for dizziness, tremors, speech difficulty, weakness, light-headedness, numbness and headaches. Hematological: Negative for adenopathy. Does not bruise/bleed easily. Psychiatric/Behavioral: Negative for confusion, decreased concentration, self-injury, sleep disturbance and suicidal ideas. The patient is not nervous/anxious. Prior to Visit Medications    Medication Sig Taking? Authorizing Provider   busPIRone (BUSPAR) 10 MG tablet Take 1 tablet by mouth 2 times daily Yes WHITNEY Robin CNP   doxycycline hyclate (VIBRA-TABS) 100 MG tablet Take 1 tablet by mouth 2 times daily for 10 days Yes WHITNEY Robin CNP   methylPREDNISolone (MEDROL DOSEPACK) 4 MG tablet Take by mouth. Yes WHITNEY Robin CNP   benzonatate (TESSALON) 100 MG capsule Take 1 capsule by mouth 3 times daily as needed for Cough Yes WHITNEY Robin CNP   losartan (COZAAR) 100 MG tablet TAKE 1 TABLET BY MOUTH DAILY FOR BLOOD PRESSURE Yes WHITNEY Robin CNP        Social History     Tobacco Use    Smoking status: Former Smoker     Years: 16.00     Types: Cigarettes     Quit date:      Years since quittin.4    Smokeless tobacco: Never Used    Tobacco comment: counseled on tobacco exposure avoidance  smokes 1/2 cigarette day   Substance Use Topics    Alcohol use: Yes     Alcohol/week: 0.0 standard drinks     Comment: socially        Vitals:    06/10/22 0743 06/10/22 0800 06/10/22 0818 06/10/22 0819   BP: 120/80 122/84 (!) 126/90 122/84   Site: Right Upper Arm  Left Upper Arm Right Upper Arm   Position: Sitting      Cuff Size: Large Adult      Pulse: 73      Resp: 20      SpO2: 96%      Weight: 285 lb (129.3 kg)  Comment: shoes on      Height: 6' 3\" (1.905 m)        Estimated body mass index is 35.62 kg/m² as calculated from the following:    Height as of this encounter: 6' 3\" (1.905 m). Weight as of this encounter: 285 lb (129.3 kg). Physical Exam  Vitals reviewed. Constitutional:       General: He is awake. Appearance: Normal appearance. He is well-developed and well-groomed. He is obese.  He is not ill-appearing or toxic-appearing. HENT:      Head: Normocephalic and atraumatic. Right Ear: Hearing, tympanic membrane, ear canal and external ear normal.      Left Ear: Hearing, tympanic membrane, ear canal and external ear normal.      Nose: Nose normal.      Mouth/Throat:      Lips: Pink. Mouth: Mucous membranes are moist.      Pharynx: Oropharynx is clear. Eyes:      General: Lids are normal.      Extraocular Movements: Extraocular movements intact. Conjunctiva/sclera: Conjunctivae normal.      Pupils: Pupils are equal, round, and reactive to light. Neck:      Thyroid: No thyromegaly. Vascular: No carotid bruit. Cardiovascular:      Rate and Rhythm: Normal rate. Pulses: Normal pulses. Carotid pulses are 2+ on the right side and 2+ on the left side. Radial pulses are 2+ on the right side and 2+ on the left side. Posterior tibial pulses are 2+ on the right side and 2+ on the left side. Heart sounds: Normal heart sounds, S1 normal and S2 normal. Heart sounds not distant. No murmur heard. Pulmonary:      Effort: Pulmonary effort is normal.      Breath sounds: Normal breath sounds. Chest:   Breasts:      Right: No supraclavicular adenopathy. Left: No supraclavicular adenopathy. Abdominal:      General: Bowel sounds are normal. There is no abdominal bruit. Palpations: Abdomen is soft. Tenderness: There is no abdominal tenderness. Genitourinary:     Comments: Deferred  Musculoskeletal:         General: Normal range of motion. Cervical back: Full passive range of motion without pain, normal range of motion and neck supple. Right lower leg: No edema. Left lower leg: No edema. Lymphadenopathy:      Head:      Right side of head: No submental, submandibular, tonsillar, preauricular, posterior auricular or occipital adenopathy.       Left side of head: No submental, submandibular, tonsillar, preauricular, posterior auricular or occipital adenopathy. Cervical: No cervical adenopathy. Right cervical: No superficial, deep or posterior cervical adenopathy. Left cervical: No superficial, deep or posterior cervical adenopathy. Upper Body:      Right upper body: No supraclavicular adenopathy. Left upper body: No supraclavicular adenopathy. Skin:     General: Skin is warm and dry. Capillary Refill: Capillary refill takes less than 2 seconds. Neurological:      General: No focal deficit present. Mental Status: He is alert and oriented to person, place, and time. Mental status is at baseline. Motor: Motor function is intact. No weakness. Coordination: Coordination is intact. Gait: Gait is intact. Psychiatric:         Attention and Perception: Attention and perception normal.         Mood and Affect: Mood and affect normal.         Speech: Speech normal.         Behavior: Behavior normal. Behavior is cooperative. Thought Content: Thought content normal.         Cognition and Memory: Cognition and memory normal.         Judgment: Judgment normal.         ASSESSMENT/PLAN:  1. Left-sided tinnitus  -     CT SINUS W WO CONTRAST; Future   Discussed scheduling with patient   Tinnitus has been chronic issue for pt since Jan 2022, this is pt most concerning symptoms  2. Anxiety  -     busPIRone (BUSPAR) 10 MG tablet; Take 1 tablet by mouth 2 times daily, Disp-180 tablet, R-0Normal   Increase buspar from 5 to 10 mg BID   Encouraged therapy  3. Acute non-recurrent pansinusitis  -     doxycycline hyclate (VIBRA-TABS) 100 MG tablet; Take 1 tablet by mouth 2 times daily for 10 days, Disp-20 tablet, R-0Normal  -     methylPREDNISolone (MEDROL DOSEPACK) 4 MG tablet; Take by mouth., Disp-1 kit, R-0Normal  -     benzonatate (TESSALON) 100 MG capsule; Take 1 capsule by mouth 3 times daily as needed for Cough, Disp-30 capsule, R-0Normal  -     CT SINUS W WO CONTRAST;  Future   Take medication in its entirety even if feeling better to avoid a resistance to antibiotics   Discussed plan for treatment with pt, tinnitus worse with sinus infection   Continue with flonase daily  4. Cough  -     benzonatate (TESSALON) 100 MG capsule; Take 1 capsule by mouth 3 times daily as needed for Cough, Disp-30 capsule, R-0Normal  -     CT SINUS W WO CONTRAST; Future   OTC robitussin/delsym encouraged, cough drops PRN   Mucinex BID PRN for congested cough  5. Essential hypertension  -     losartan (COZAAR) 100 MG tablet; TAKE 1 TABLET BY MOUTH DAILY FOR BLOOD PRESSURE, Disp-90 tablet, R-0Normal   BP generally well controlled but diastolic is slightly higher in left arm than right arm   Follow a low sodium diet   Physical activity 150 minutes weekly recommended    Weight loss, initial goal 10% of body weight recommended   No need to restart verapamil   Consider adding metoprolol or low dose diuretic at next visit if diastolic remains elevated  6. Impaired fasting glucose  -     POCT glycosylated hemoglobin (Hb A1C)    IFG at physical fasting labs   A1C today is 5.3   Information printed and reviewed        Care Gaps Addressed  A1C today s/t IFG  TDAP vaccine recommended- call insurance to discuss coverage  COVID booster recommended  [facility]- has referral, + fam hx    I have reviewed patient's pertinent medical history, relevant laboratory and imaging studies, and past/future health maintenance. Discussed with the patient the importance of adhering to their current medication regimen as directed. Advised the patient that they should continue to work on eating a healthy balanced diet and staying active by exercising within their personal limits. Orders as listed above. Patient was advised to keep future appointments with their respective specialty care team(s). Patient had the opportunity to ask questions, all of which were answered to the best of my ability and with patient satisfaction.  Patient understands and is agreeable with the care plan following today's visit. Patient is to schedule an appointment for any new or worsening symptoms. Go to ER for significant shortness of breath, chest pain, or uncontrolled pain or fever. I discussed with patient the risk and benefits of any medications that were prescribed today. I verified that the patient understands their medications, labs, and/or procedures. The patient is doing well with current medication regimen and does not have any barriers to adherence. The patient's self-management abilities are good. Return in about 2 months (around 8/10/2022) for Anxiety Follow Up, HTN Follow Up, tinnitus. An electronic signature was used to authenticate this note.     --WHITNEY Perez - CNP on 6/10/2022 at 8:28 AM

## 2022-06-10 NOTE — PATIENT INSTRUCTIONS
CT sinus- call [address] [address] to schedule      Patient Education        Tinnitus: Care Instructions  Overview     Many people have some ringing sounds in their ears once in a while. You may hear a roar, a hiss, a tinkle, or a buzz. The sound usually lasts only a few minutes. Ringing in the ears that doesn't get better or go away is calledtinnitus. Tinnitus is usually caused by long-term exposure to loud noise. This damages the nerves in the inner ear. It can occur with all types of hearing loss. Itmay be a symptom of almost any ear problem. Tinnitus may be caused by a buildup of earwax. Or it may be caused by ear infections or certain medicines (especially antibiotics or large amounts of aspirin). You can also hear noises in your ears because of an injury to theears or a medical condition. You may need tests to evaluate your hearing and to find causes of long-lastingtinnitus. Your doctor may suggest one or more treatments to help you cope with it. [place] also do things at home to help reduce symptoms. Follow-up care is a key part of your treatment and safety. Be sure to make and go to all appointments, and call your doctor if you are having problems. It's also a good idea to know your test results and keep alist of the medicines you take. How can you care for yourself at home?  Do not smoke or use other tobacco products. Nicotine reduces blood flow to the ear and makes tinnitus worse. If you need help quitting, talk to your doctor about stop-smoking programs and medicines. These can increase your chances of quitting for good.  Talk to your doctor about whether to stop taking aspirin and similar products such as ibuprofen or naproxen.  Get exercise often. It can improve blood flow to the ear. Ways to cope with noise  Some tinnitus may last a long time. To cope with noise, try to:   Avoid noises that you think caused your tinnitus. If you can't avoid loud noises, wear earplugs or earmuffs.    Ignore the sound by paying attention to other things.  Relax using biofeedback, meditation, or yoga. Feeling stressed and being tired can make tinnitus worse.  Play music or white noise to help you sleep. Background noise may cover up the noise that you hear in your ears. You can buy a machine that makes soothing sounds, such as ocean waves. When should you call for help? Call 911 anytime you think you may need emergency care. For example, call if:     You have symptoms of a stroke. These may include:  ? Sudden numbness, tingling, weakness, or loss of movement in your face, arm, or leg, especially on only one side of your body. ? Sudden vision changes. ? Sudden trouble speaking. ? Sudden confusion or trouble understanding simple statements. ? Sudden problems with walking or balance. ? A sudden, severe headache that is different from past headaches. Call your doctor now or seek immediate medical care if:     You develop other symptoms. These may include hearing loss (or worse hearing loss), balance problems, dizziness, nausea, or vomiting. Watch closely for changes in your health, and be sure to contact your doctor if:     Your tinnitus moves from both ears to one ear.      Your hearing loss gets worse within 1 day after an ear injury.      Your tinnitus or hearing loss does not get better within 1 week after an ear injury.      Your tinnitus bothers you enough that you want to take medicines to help you cope with it. Where can you learn more? Go to https://RingTujackyeb.Viva Developments. org and sign in to your Kyron account. Enter S165 in the ActivIdentity box to learn more about \"Tinnitus: Care Instructions. \"     If you do not have an account, please click on the \"Sign Up Now\" link. Current as of: September 8, 2021               Content Version: 13.2  © 4806-8057 Healthwise, Incorporated. Care instructions adapted under license by [facility] ([facility]).  If you have questions about a medical condition or this instruction, always ask your healthcare professional. [address] any warranty or liability for your use of this information. Patient Education        Prediabetes: Care Instructions  Overview     Prediabetes is a warning sign that you're at risk for getting type 2 diabetes. It means that your blood sugar is higher than it should be. But it's not highenough to be diabetes. The food you eat naturally turns into sugar. Your body uses the sugar for energy. Normally, an organ called the pancreas makes insulin. And insulin allows the sugar in your blood to get into your body's cells. But sometimes the body can't use insulin the right way. So the sugar stays in your blood instead. This is called insulin resistance. The buildup of sugar in your blood means youhave prediabetes. The good news is that you may be able to prevent or delay diabetes. Making small lifestyle changes, like getting active and changing your eating habits, may help you get your blood sugar back to normal. You can work with your doctorto make a treatment plan. Follow-up care is a key part of your treatment and safety. Be sure to make and go to all appointments, and call your doctor if you are having problems. It's also a good idea to know your test results and keep alist of the medicines you take. How can you care for yourself at home?  Watch your weight. A healthy weight helps your body use insulin properly.  Limit the amount of calories, sweets, and unhealthy fat you eat. Ask your doctor if you should see a dietitian. A registered dietitian can help you create meal plans that fit your lifestyle.  Get at least 30 minutes of exercise on most days of the week. Exercise helps control your blood sugar. It also helps you maintain a healthy weight. Walking is a good choice. You also may want to do other activities, such as running, swimming, cycling, or playing tennis or team sports.  Do not smoke.  Smoking can make prediabetes worse. If you need help quitting, talk to your doctor about stop-smoking programs and medicines. These can increase your chances of quitting for good.  If your doctor prescribed medicines, take them exactly as prescribed. Call your doctor if you think you are having a problem with your medicine. You will get more details on the specific medicines your doctor prescribes. When should you call for help? Watch closely for changes in your health, and be sure to contact your doctor if:     You have any symptoms of diabetes. These may include:  ? Being thirsty more often. ? Urinating more. ? Being hungrier. ? Losing weight. ? Being very tired. ? Having blurry vision.      You have a wound that will not heal.      You have an infection that will not go away.      You have problems with your blood pressure.      You want more information about diabetes and how you can keep from getting it. Where can you learn more? Go to https://YazinopeUptake.MLD Solutions. org and sign in to your Adbongo account. Enter I222 in the Blippy [employer] box to learn more about \"Prediabetes: Care Instructions. \"     If you do not have an account, please click on the \"Sign Up Now\" link. Current as of: July 28, 2021               Content Version: 13.2  © 2006-2022 Healthwise, Incorporated. Care instructions adapted under license by [facility] ([facility]). If you have questions about a medical condition or this instruction, always ask your healthcare professional. [place] [address] any warranty or liability for your use of this information.

## 2022-06-18 DIAGNOSIS — G44.019 EPISODIC CLUSTER HEADACHE, NOT INTRACTABLE: ICD-10-CM

## 2022-06-23 ENCOUNTER — HOSPITAL ENCOUNTER (OUTPATIENT)
Dept: CT IMAGING | Age: 47
Discharge: HOME | End: 2022-06-23
Payer: COMMERCIAL

## 2022-06-23 DIAGNOSIS — R05.9 COUGH: ICD-10-CM

## 2022-06-23 DIAGNOSIS — H93.12 LEFT-SIDED TINNITUS: ICD-10-CM

## 2022-06-23 DIAGNOSIS — J01.40 ACUTE NON-RECURRENT PANSINUSITIS: ICD-10-CM

## 2022-06-23 PROCEDURE — 70486 CT MAXILLOFACIAL W/O DYE: CPT

## 2022-06-27 DIAGNOSIS — J01.00 ACUTE NON-RECURRENT MAXILLARY SINUSITIS: Primary | ICD-10-CM

## 2022-06-27 RX ORDER — AMOXICILLIN AND CLAVULANATE POTASSIUM 875; 125 MG/1; MG/1
1 TABLET, FILM COATED ORAL 2 TIMES DAILY
Qty: 20 TABLET | Refills: 0 | Status: SHIPPED | OUTPATIENT
Start: 2022-06-27 | End: 2022-07-07

## 2022-06-29 ENCOUNTER — PATIENT MESSAGE (OUTPATIENT)
Dept: FAMILY MEDICINE CLINIC | Age: 47
End: 2022-06-29

## 2022-06-29 NOTE — TELEPHONE ENCOUNTER
From: Wake John  To: Lindsay Ritalalo  Sent: 6/29/2022 7:48 AM EDT  Subject: Tinnitus    Hi Cushing, thanks for sending me results of CT Scan, while I am very happy they didnt find anything serious, I am getting more and more frustrated and getting a helpless feeling with regard to my tinnitus. It is the same, if not worse (louder). I will pickup the prescription you called in tomorrow and start taking it right away. If there is anything else you recommend we try I am open to anything.     Thank you    Chip

## 2022-08-15 ENCOUNTER — OFFICE VISIT (OUTPATIENT)
Dept: FAMILY MEDICINE CLINIC | Age: 47
End: 2022-08-15
Payer: COMMERCIAL

## 2022-08-15 VITALS
HEIGHT: 75 IN | WEIGHT: 285 LBS | OXYGEN SATURATION: 97 % | BODY MASS INDEX: 35.43 KG/M2 | DIASTOLIC BLOOD PRESSURE: 80 MMHG | HEART RATE: 70 BPM | RESPIRATION RATE: 16 BRPM | SYSTOLIC BLOOD PRESSURE: 126 MMHG

## 2022-08-15 DIAGNOSIS — R06.83 SNORING: ICD-10-CM

## 2022-08-15 DIAGNOSIS — F41.9 ANXIETY: ICD-10-CM

## 2022-08-15 DIAGNOSIS — I10 ESSENTIAL HYPERTENSION: Primary | ICD-10-CM

## 2022-08-15 DIAGNOSIS — R53.83 ALWAYS TIRED: ICD-10-CM

## 2022-08-15 PROCEDURE — 99214 OFFICE O/P EST MOD 30 MIN: CPT | Performed by: NURSE PRACTITIONER

## 2022-08-15 RX ORDER — BUSPIRONE HYDROCHLORIDE 15 MG/1
15 TABLET ORAL 2 TIMES DAILY
Qty: 180 TABLET | Refills: 3 | Status: SHIPPED | OUTPATIENT
Start: 2022-08-15 | End: 2022-09-14

## 2022-08-15 RX ORDER — LOSARTAN POTASSIUM 100 MG/1
TABLET ORAL
Qty: 90 TABLET | Refills: 3 | Status: SHIPPED | OUTPATIENT
Start: 2022-08-15

## 2022-08-15 ASSESSMENT — ENCOUNTER SYMPTOMS
DIARRHEA: 0
SINUS PAIN: 0
COLOR CHANGE: 0
ABDOMINAL DISTENTION: 0
COUGH: 0
SINUS PRESSURE: 0
SHORTNESS OF BREATH: 0
ABDOMINAL PAIN: 0
BACK PAIN: 0
CONSTIPATION: 0
CHEST TIGHTNESS: 0
EYE DISCHARGE: 0
NAUSEA: 0

## 2022-08-15 NOTE — PROGRESS NOTES
Date of Service:  8/15/2022    Sonja Conde (:  1975) is a 41 y.o. male, here for evaluation of the following medical concerns:    Chief Complaint   Patient presents with    Hypertension     FOLLOW UP ON HTN     Anxiety     FOLLOW UP ON ANXIETY        HPI    Hypertension:  Home blood pressure monitoring: Yes - rarely, rare occasions. BP at home today 128/83. He is not adherent to a low sodium diet. Patient complains of pulsatile tinnitus. Antihypertensive medication side effects: no medication side effects noted. Use of agents associated with hypertension: none. Anxiety  Pt is on buspar 10 mg BID. Does not feel he notices a huge difference. Pt doesn't feel he has ever been a happy giddy go nilson person. Pt still having constant unilateral pulsatile tinnitus. Now having intermittent right sided tinnitus. Wonders if he has always dealt with this or notices \"sounds in his head\" more often now. Has had friends and family who have dealt with it. Talked about exercises for vertigo to see if this would help, talked about a podcast his friend listened to that had recommendations that helped him, and considering seeing a  specialist or [facility] specialist. He suffers from anxiety but does not feel it is bad or controls his life. BP is well controlled. Does not want to try other maintenance anxiety meds at this time. Main concern is sleeping. Wife says he snores. Is tired all the time. Sleeps about 5 hours. Has trouble falling asleep and staying asleep. Has tried melatonin, aleve nighttime, [facility]. Unknown fam hx of sleep apnea. Wife has not specified if he stops breathing when he is sleeping. Goes to sleep in other room often because he is keeping wife awake. Says he could never wear a CPAP thing, thinks he would put it in a closet.                                        Sodium (mmol/L)   Date Value   2022 145    BUN (mg/dL)   Date Value   2022 19    Glucose (mg/dL) Date Value   05/11/2022 108 (H)      Potassium (mmol/L)   Date Value   05/11/2022 4.8    Creatinine (mg/dL)   Date Value   05/11/2022 0.8 (L)           Review of Systems   Constitutional:  Negative for activity change, appetite change, fatigue, fever and unexpected weight change. HENT:  Positive for tinnitus. Negative for congestion, ear pain, sinus pressure and sinus pain. Eyes:  Negative for discharge and visual disturbance. Respiratory:  Negative for cough, chest tightness and shortness of breath. Cardiovascular:  Negative for chest pain, palpitations and leg swelling. Gastrointestinal:  Negative for abdominal distention, abdominal pain, constipation, diarrhea and nausea. Endocrine: Negative for cold intolerance, heat intolerance, polydipsia, polyphagia and polyuria. Genitourinary:  Negative for decreased urine volume, difficulty urinating, dysuria, flank pain, frequency and urgency. Musculoskeletal:  Negative for arthralgias, back pain, gait problem, joint swelling, myalgias and neck pain. Skin:  Negative for color change, rash and wound. Allergic/Immunologic: Negative for food allergies and immunocompromised state. Neurological:  Negative for dizziness, tremors, speech difficulty, weakness, light-headedness, numbness and headaches. Hematological:  Negative for adenopathy. Does not bruise/bleed easily. Psychiatric/Behavioral:  Negative for confusion, decreased concentration, self-injury, sleep disturbance and suicidal ideas. The patient is not nervous/anxious. Prior to Visit Medications    Medication Sig Taking? Authorizing Provider   losartan (COZAAR) 100 MG tablet TAKE 1 TABLET BY MOUTH DAILY FOR BLOOD PRESSURE Yes WHITNEY Falcon CNP   busPIRone (BUSPAR) 15 MG tablet Take 15 mg by mouth in the morning and 15 mg before bedtime.  Yes WHITNEY Falcon CNP        Social History     Tobacco Use    Smoking status: Former     Years: 16.00     Types: Cigarettes     Quit date:      Years since quittin.6    Smokeless tobacco: Never    Tobacco comments:     counseled on tobacco exposure avoidance  smokes 1/2 cigarette day   Substance Use Topics    Alcohol use: Yes     Alcohol/week: 0.0 standard drinks     Comment: socially        Vitals:    08/15/22 0734   BP: 126/80   Site: Right Upper Arm   Position: Sitting   Cuff Size: Large Adult   Pulse: 70   Resp: 16   SpO2: 97%   Weight: 285 lb (129.3 kg)  Comment: shoes on   Height: 6' 3\" (1.905 m)     Estimated body mass index is 35.62 kg/m² as calculated from the following:    Height as of this encounter: 6' 3\" (1.905 m). Weight as of this encounter: 285 lb (129.3 kg). Physical Exam  Vitals reviewed. Constitutional:       General: He is awake. Appearance: Normal appearance. He is well-developed and well-groomed. He is obese. He is not ill-appearing or toxic-appearing. HENT:      Head: Normocephalic and atraumatic. Right Ear: Hearing, tympanic membrane, ear canal and external ear normal.      Left Ear: Hearing, tympanic membrane, ear canal and external ear normal.      Nose: Nose normal.      Mouth/Throat:      Lips: Pink. Mouth: Mucous membranes are moist.      Pharynx: Oropharynx is clear. Eyes:      General: Lids are normal.      Extraocular Movements: Extraocular movements intact. Conjunctiva/sclera: Conjunctivae normal.      Pupils: Pupils are equal, round, and reactive to light. Neck:      Thyroid: No thyromegaly. Vascular: No carotid bruit. Cardiovascular:      Rate and Rhythm: Normal rate. Pulses: Normal pulses. Carotid pulses are 2+ on the right side and 2+ on the left side. Radial pulses are 2+ on the right side and 2+ on the left side. Posterior tibial pulses are 2+ on the right side and 2+ on the left side. Heart sounds: Normal heart sounds, S1 normal and S2 normal. Heart sounds not distant. No murmur heard.   Pulmonary: Effort: Pulmonary effort is normal.      Breath sounds: Normal breath sounds. Chest:   Breasts:     Right: No supraclavicular adenopathy. Left: No supraclavicular adenopathy. Abdominal:      General: Bowel sounds are normal. There is no abdominal bruit. Palpations: Abdomen is soft. Tenderness: There is no abdominal tenderness. Genitourinary:     Comments: Deferred  Musculoskeletal:         General: Normal range of motion. Cervical back: Full passive range of motion without pain, normal range of motion and neck supple. Right lower leg: No edema. Left lower leg: No edema. Lymphadenopathy:      Head:      Right side of head: No submental, submandibular, tonsillar, preauricular, posterior auricular or occipital adenopathy. Left side of head: No submental, submandibular, tonsillar, preauricular, posterior auricular or occipital adenopathy. Cervical: No cervical adenopathy. Right cervical: No superficial, deep or posterior cervical adenopathy. Left cervical: No superficial, deep or posterior cervical adenopathy. Upper Body:      Right upper body: No supraclavicular adenopathy. Left upper body: No supraclavicular adenopathy. Skin:     General: Skin is warm and dry. Capillary Refill: Capillary refill takes less than 2 seconds. Neurological:      General: No focal deficit present. Mental Status: He is alert and oriented to person, place, and time. Mental status is at baseline. Motor: Motor function is intact. No weakness. Coordination: Coordination is intact. Gait: Gait is intact. Psychiatric:         Attention and Perception: Attention and perception normal.         Mood and Affect: Mood and affect normal.         Speech: Speech normal.         Behavior: Behavior normal. Behavior is cooperative. Thought Content:  Thought content normal.         Cognition and Memory: Cognition and memory normal.         Judgment: Judgment normal.       ASSESSMENT/PLAN:  1. Essential hypertension  -     losartan (COZAAR) 100 MG tablet; TAKE 1 TABLET BY MOUTH DAILY FOR BLOOD PRESSURE, Disp-90 tablet, R-3Normal   Follow a low sodium diet   Physical activity 150 minutes weekly recommended    Weight loss, initial goal 10% of body weight recommended    BP well controlled at visit today  2. Anxiety  -     busPIRone (BUSPAR) 15 MG tablet; Take 15 mg by mouth in the morning and 15 mg before bedtime. , Disp-180 tablet, R-3Normal   Increase buspar from 10 to 15 mg BID   No other maintenance medication at this time per pt request and discussion    Encouraged counseling   Low concern for suicide  3. Snoring  -     Ashish Valencia MD, Sleep Medicine, [facility]   Recommend sleep study   Pt feels he could never wear CPAP, discussed concerns with untreated sleep apnea  4. Always tired  -     Ashish Valencia MD, Sleep Medicine, [facility]   Discussed ruling out sleep apnea d/t reported snoring and frequent waking at night before trying prescription sleep aids   Discussed healthy sleep habits         Care Gaps Addressed  TDAP vaccine recommended- call insurance to discuss coverage  COVID vaccine recommended  Colon cancer screening discussed- referral given      I have reviewed patient's pertinent medical history, relevant laboratory and imaging studies, and past/future health maintenance. Discussed with the patient the importance of adhering to their current medication regimen as directed. Advised the patient that they should continue to work on eating a healthy balanced diet and staying active by exercising within their personal limits. Orders as listed above. Patient was advised to keep future appointments with their respective specialty care team(s). Patient had the opportunity to ask questions, all of which were answered to the best of my ability and with patient satisfaction.  Patient understands and is agreeable with the care plan following today's visit. Patient is to schedule an appointment for any new or worsening symptoms. Go to ER for significant shortness of breath, chest pain, or uncontrolled pain or fever. I discussed with patient the risk and benefits of any medications that were prescribed today. I verified that the patient understands their medications, labs, and/or procedures. The patient is doing well with current medication regimen and does not have any barriers to adherence. The patient's self-management abilities are good. Return in about 6 months (around 2/15/2023) for HTN Follow Up, Anxiety Follow Up. An electronic signature was used to authenticate this note.     --WHITNEY Carreon - CNP on 8/15/2022 at 8:16 AM

## 2022-08-23 ENCOUNTER — PATIENT MESSAGE (OUTPATIENT)
Dept: FAMILY MEDICINE CLINIC | Age: 47
End: 2022-08-23

## 2022-08-23 NOTE — TELEPHONE ENCOUNTER
From: Chelo Soriano  To: Harjeet Potter  Sent: 8/23/2022 2:57 PM EDT  Subject: Sore Throat / Headache    Hi Bhumika,    I woke up in middle of night last night with an awful sore throat and a lot of drainage. also now have sinus pressure on left side of head. No fever that I can tell. I took an at home Covid test and it was negative. Is this just another sinus infection or is this somehting I should come in a see you about?     Thank You    Chip

## 2022-10-06 DIAGNOSIS — F41.9 ANXIETY: ICD-10-CM

## 2022-10-06 RX ORDER — BUSPIRONE HYDROCHLORIDE 10 MG/1
TABLET ORAL
Qty: 180 TABLET | Refills: 0 | OUTPATIENT
Start: 2022-10-06

## 2023-01-31 ENCOUNTER — PATIENT MESSAGE (OUTPATIENT)
Dept: FAMILY MEDICINE CLINIC | Age: 48
End: 2023-01-31

## 2023-01-31 NOTE — TELEPHONE ENCOUNTER
From: Christian Villaseñor  To: Fermín Vanessa  Sent: 1/31/2023 10:16 AM EST  Subject: Flu? Colonoscopy scheduled    Hi Bhumika. .. Rodrigo Monroy I hope you are doing well. I have a couple questions. First, I have my colonoscopy scheduled for next Friday 2/10. ...is it ok to continue to take my medications (BloodPressure & Anxiety meds) leading up to the procedure? Second. .. Rodrigo Monroy I have been pretty sick since Sunday evening, aches, chills, severe nasal congestion, cough and headaches. Is this just the flu and I need to let take it's course or is there anything I should be taking/doing? if I am not feeling better in the next day or so should I make an appointment to see you? Any concern I should have being this sick this close to y scheduled colonoscopy? Sorry so many questions. ...thank you    Carlos Anna

## 2023-02-15 ENCOUNTER — OFFICE VISIT (OUTPATIENT)
Dept: FAMILY MEDICINE CLINIC | Age: 48
End: 2023-02-15
Payer: COMMERCIAL

## 2023-02-15 VITALS
OXYGEN SATURATION: 98 % | HEIGHT: 75 IN | SYSTOLIC BLOOD PRESSURE: 122 MMHG | WEIGHT: 293 LBS | DIASTOLIC BLOOD PRESSURE: 82 MMHG | HEART RATE: 70 BPM | BODY MASS INDEX: 36.43 KG/M2 | RESPIRATION RATE: 16 BRPM

## 2023-02-15 DIAGNOSIS — I10 ESSENTIAL HYPERTENSION: Primary | ICD-10-CM

## 2023-02-15 DIAGNOSIS — H93.12 LEFT-SIDED TINNITUS: ICD-10-CM

## 2023-02-15 DIAGNOSIS — F41.9 ANXIETY: ICD-10-CM

## 2023-02-15 DIAGNOSIS — R05.1 ACUTE COUGH: ICD-10-CM

## 2023-02-15 DIAGNOSIS — J01.00 ACUTE NON-RECURRENT MAXILLARY SINUSITIS: ICD-10-CM

## 2023-02-15 PROCEDURE — 99214 OFFICE O/P EST MOD 30 MIN: CPT | Performed by: NURSE PRACTITIONER

## 2023-02-15 PROCEDURE — 3079F DIAST BP 80-89 MM HG: CPT | Performed by: NURSE PRACTITIONER

## 2023-02-15 PROCEDURE — 3074F SYST BP LT 130 MM HG: CPT | Performed by: NURSE PRACTITIONER

## 2023-02-15 RX ORDER — BUSPIRONE HYDROCHLORIDE 15 MG/1
15 TABLET ORAL 2 TIMES DAILY
COMMUNITY
End: 2023-02-15 | Stop reason: SDUPTHER

## 2023-02-15 RX ORDER — BENZONATATE 100 MG/1
100 CAPSULE ORAL 3 TIMES DAILY PRN
Qty: 30 CAPSULE | Refills: 0 | Status: SHIPPED | OUTPATIENT
Start: 2023-02-15 | End: 2023-02-22

## 2023-02-15 RX ORDER — AMOXICILLIN AND CLAVULANATE POTASSIUM 875; 125 MG/1; MG/1
1 TABLET, FILM COATED ORAL 2 TIMES DAILY
Qty: 20 TABLET | Refills: 0 | Status: SHIPPED | OUTPATIENT
Start: 2023-02-15 | End: 2023-02-25

## 2023-02-15 RX ORDER — BUSPIRONE HYDROCHLORIDE 15 MG/1
15 TABLET ORAL 2 TIMES DAILY
Qty: 180 TABLET | Refills: 1 | Status: SHIPPED | OUTPATIENT
Start: 2023-02-15

## 2023-02-15 SDOH — ECONOMIC STABILITY: FOOD INSECURITY: WITHIN THE PAST 12 MONTHS, THE FOOD YOU BOUGHT JUST DIDN'T LAST AND YOU DIDN'T HAVE MONEY TO GET MORE.: NEVER TRUE

## 2023-02-15 SDOH — ECONOMIC STABILITY: INCOME INSECURITY: HOW HARD IS IT FOR YOU TO PAY FOR THE VERY BASICS LIKE FOOD, HOUSING, MEDICAL CARE, AND HEATING?: NOT HARD AT ALL

## 2023-02-15 SDOH — ECONOMIC STABILITY: HOUSING INSECURITY
IN THE LAST 12 MONTHS, WAS THERE A TIME WHEN YOU DID NOT HAVE A STEADY PLACE TO SLEEP OR SLEPT IN A SHELTER (INCLUDING NOW)?: NO

## 2023-02-15 SDOH — ECONOMIC STABILITY: FOOD INSECURITY: WITHIN THE PAST 12 MONTHS, YOU WORRIED THAT YOUR FOOD WOULD RUN OUT BEFORE YOU GOT MONEY TO BUY MORE.: NEVER TRUE

## 2023-02-15 ASSESSMENT — ENCOUNTER SYMPTOMS
CONSTIPATION: 0
EYE DISCHARGE: 0
NAUSEA: 0
DIARRHEA: 0
SINUS PAIN: 0
ABDOMINAL DISTENTION: 0
COLOR CHANGE: 0
BACK PAIN: 0
SHORTNESS OF BREATH: 0
ABDOMINAL PAIN: 0
COUGH: 0
CHEST TIGHTNESS: 0
SINUS PRESSURE: 0

## 2023-02-15 ASSESSMENT — PATIENT HEALTH QUESTIONNAIRE - PHQ9
SUM OF ALL RESPONSES TO PHQ QUESTIONS 1-9: 1
SUM OF ALL RESPONSES TO PHQ9 QUESTIONS 1 & 2: 1
SUM OF ALL RESPONSES TO PHQ QUESTIONS 1-9: 1
2. FEELING DOWN, DEPRESSED OR HOPELESS: 1
1. LITTLE INTEREST OR PLEASURE IN DOING THINGS: 0

## 2023-02-15 NOTE — PATIENT INSTRUCTIONS
Patient Education        Sinusitis: Care Instructions  Your Care Instructions     Sinusitis is an infection of the lining of the sinus cavities in your head. Sinusitis often follows a cold. It causes pain and pressure in your head and face. In most cases, sinusitis gets better on its own in 1 to 2 weeks. But some mild symptoms may last for several weeks. Sometimes antibiotics are needed. Follow-up care is a key part of your treatment and safety. Be sure to make and go to all appointments, and call your doctor if you are having problems. It's also a good idea to know your test results and keep a list of the medicines you take. How can you care for yourself at home? Take an over-the-counter pain medicine, such as acetaminophen (Tylenol), ibuprofen (Advil, Motrin), or naproxen (Aleve). Read and follow all instructions on the label. If the doctor prescribed antibiotics, take them as directed. Do not stop taking them just because you feel better. You need to take the full course of antibiotics. Be careful when taking over-the-counter cold or flu medicines and Tylenol at the same time. Many of these medicines have acetaminophen, which is Tylenol. Read the labels to make sure that you are not taking more than the recommended dose. Too much acetaminophen (Tylenol) can be harmful. Breathe warm, moist air from a steamy shower, a hot bath, or a sink filled with hot water. Avoid cold, dry air. Using a humidifier in your home may help. Follow the directions for cleaning the machine. Use saline (saltwater) nasal washes. This can help keep your nasal passages open and wash out mucus and bacteria. You can buy saline nose drops at a grocery store or drugstore. Or you can make your own at home by adding 1 teaspoon of salt and 1 teaspoon of baking soda to 2 cups of distilled water. If you make your own, fill a bulb syringe with the solution, insert the tip into your nostril, and squeeze gently. [place] your nose.   Put a hot, wet towel or a warm gel pack on your face 3 or 4 times a day for 5 to 10 minutes each time. Try a decongestant nasal spray like oxymetazoline (Afrin). Do not use it for more than 3 days in a row. Using it for more than 3 days can make your congestion worse. When should you call for help? Call your doctor now or seek immediate medical care if:    You have new or worse swelling or redness in your face or around your eyes. You have a new or higher fever. Watch closely for changes in your health, and be sure to contact your doctor if:    You have new or worse facial pain. The mucus from your nose becomes thicker (like pus) or has new blood in it. You are not getting better as expected. Where can you learn more? Go to http://www.woods.com/ and enter M975 to learn more about \"Sinusitis: Care Instructions. \"  Current as of: May 4, 2022               Content Version: 13.5  © 2006-2022 Healthwise, Incorporated. Care instructions adapted under license by [facility] ([facility]). If you have questions about a medical condition or this instruction, always ask your healthcare professional. [place] [address] any warranty or liability for your use of this information.

## 2023-02-15 NOTE — PROGRESS NOTES
Date of Service:  2/15/2023    Mary Etienne (:  1975) is a 41 y.o. male, here for evaluation of the following medical concerns:    Chief Complaint   Patient presents with    Hypertension     6 mon HTN f/u        HPI    Hypertension:  Home blood pressure monitoring: No.  He is not adherent to a low sodium diet. Patient denies chest pain, shortness of breath, headache, lightheadedness, blurred vision, peripheral edema, palpitations, dry cough, and fatigue. Antihypertensive medication side effects: no medication side effects noted. Use of agents associated with hypertension: none. Upper Respiratory Infection  Patient complains of symptoms of a URI. Symptoms include cough. Onset of symptoms was 2 weeks ago, unchanged since that time. He also c/o  brief fever initially, still congested now  for the past 2 weeks . He is drinking plenty of fluids. Evaluation to date: none. Treatment to date: mucinex. Last abx 2022. Leaving today for [place] with his wife. Sodium (mmol/L)   Date Value   2022 145    BUN (mg/dL)   Date Value   2022 19    Glucose (mg/dL)   Date Value   2022 108 (H)      Potassium (mmol/L)   Date Value   2022 4.8    Creatinine (mg/dL)   Date Value   2022 0.8 (L)           Review of Systems   Constitutional:  Negative for activity change, appetite change, fatigue, fever and unexpected weight change. HENT:  Positive for tinnitus (left sided). Negative for congestion, ear pain, sinus pressure and sinus pain. Eyes:  Negative for discharge and visual disturbance. Respiratory:  Negative for cough, chest tightness and shortness of breath. Cardiovascular:  Negative for chest pain, palpitations and leg swelling. Gastrointestinal:  Negative for abdominal distention, abdominal pain, constipation, diarrhea and nausea.    Endocrine: Negative for cold intolerance, heat intolerance, polydipsia, polyphagia and polyuria. Genitourinary:  Negative for decreased urine volume, difficulty urinating, dysuria, flank pain, frequency and urgency. Musculoskeletal:  Negative for arthralgias, back pain, gait problem, joint swelling, myalgias and neck pain. Skin:  Negative for color change, rash and wound. Allergic/Immunologic: Negative for food allergies and immunocompromised state. Neurological:  Negative for dizziness, tremors, speech difficulty, weakness, light-headedness, numbness and headaches. Hematological:  Negative for adenopathy. Does not bruise/bleed easily. Psychiatric/Behavioral:  Negative for confusion, decreased concentration, self-injury, sleep disturbance and suicidal ideas. The patient is not nervous/anxious. Prior to Visit Medications    Medication Sig Taking? Authorizing Provider   busPIRone (BUSPAR) 15 MG tablet Take 15 mg by mouth in the morning and at bedtime Yes WHITNEY Carlin CNP   amoxicillin-clavulanate (AUGMENTIN) 875-125 MG per tablet Take 1 tablet by mouth 2 times daily for 10 days Yes WHITNEY Carlin CNP   benzonatate (TESSALON) 100 MG capsule Take 1 capsule by mouth 3 times daily as needed for Cough Yes WHITNEY Carlin CNP   losartan (COZAAR) 100 MG tablet TAKE 1 TABLET BY MOUTH DAILY FOR BLOOD PRESSURE Yes WHITNEY Carlin CNP        Social History     Tobacco Use    Smoking status: Former     Years: 16.00     Types: Cigarettes     Quit date:      Years since quittin.1    Smokeless tobacco: Never    Tobacco comments:     counseled on tobacco exposure avoidance  smokes 1/2 cigarette day   Substance Use Topics    Alcohol use:  Yes     Alcohol/week: 0.0 standard drinks     Comment: socially        Vitals:    02/15/23 0735   BP: 122/82   Site: Left Upper Arm   Position: Sitting   Cuff Size: Large Adult   Pulse: 70   Resp: 16   SpO2: 98%   Weight: 293 lb (132.9 kg)   Height: 6' 3\" (1.905 m)     Estimated body mass index is 36.62 kg/m² as calculated from the following:    Height as of this encounter: 6' 3\" (1.905 m). Weight as of this encounter: 293 lb (132.9 kg). Physical Exam  Vitals reviewed. Constitutional:       General: He is awake. Appearance: Normal appearance. He is well-developed and well-groomed. He is obese. He is not ill-appearing or toxic-appearing. HENT:      Head: Normocephalic and atraumatic. Right Ear: Hearing, tympanic membrane, ear canal and external ear normal.      Left Ear: Hearing, tympanic membrane, ear canal and external ear normal.      Nose:      Left Sinus: Maxillary sinus tenderness present. Comments: Swollen maxillary     Mouth/Throat:      Lips: Pink. Mouth: Mucous membranes are moist.      Pharynx: Oropharynx is clear. Eyes:      General: Lids are normal.      Extraocular Movements: Extraocular movements intact. Conjunctiva/sclera: Conjunctivae normal.      Pupils: Pupils are equal, round, and reactive to light. Neck:      Thyroid: No thyromegaly. Vascular: No carotid bruit. Cardiovascular:      Rate and Rhythm: Normal rate. Pulses: Normal pulses. Carotid pulses are 2+ on the right side and 2+ on the left side. Radial pulses are 2+ on the right side and 2+ on the left side. Posterior tibial pulses are 2+ on the right side and 2+ on the left side. Heart sounds: Normal heart sounds, S1 normal and S2 normal. Heart sounds not distant. No murmur heard. Pulmonary:      Effort: Pulmonary effort is normal.      Breath sounds: Normal breath sounds. Abdominal:      General: Bowel sounds are normal. There is no abdominal bruit. Palpations: Abdomen is soft. Tenderness: There is no abdominal tenderness. Genitourinary:     Comments: Deferred  Musculoskeletal:         General: Normal range of motion. Cervical back: Full passive range of motion without pain, normal range of motion and neck supple.       Right lower leg: No edema. Left lower leg: No edema. Lymphadenopathy:      Head:      Right side of head: No submental, submandibular, tonsillar, preauricular, posterior auricular or occipital adenopathy. Left side of head: No submental, submandibular, tonsillar, preauricular, posterior auricular or occipital adenopathy. Cervical: No cervical adenopathy. Right cervical: No superficial, deep or posterior cervical adenopathy. Left cervical: No superficial, deep or posterior cervical adenopathy. Upper Body:      Right upper body: No supraclavicular adenopathy. Left upper body: No supraclavicular adenopathy. Skin:     General: Skin is warm and dry. Capillary Refill: Capillary refill takes less than 2 seconds. Neurological:      General: No focal deficit present. Mental Status: He is alert and oriented to person, place, and time. Mental status is at baseline. Motor: Motor function is intact. No weakness. Coordination: Coordination is intact. Gait: Gait is intact. Psychiatric:         Attention and Perception: Attention and perception normal.         Mood and Affect: Mood and affect normal.         Speech: Speech normal.         Behavior: Behavior normal. Behavior is cooperative. Thought Content: Thought content normal.         Cognition and Memory: Cognition and memory normal.         Judgment: Judgment normal.       ASSESSMENT/PLAN:  1. Essential hypertension    BP well controlled at visit today   Follow a low sodium diet   Physical activity 150 minutes weekly recommended    Weight loss, initial goal 10% of body weight recommended   Continue losartan 100 mg daily   No changes in medication today  2. Left-sided tinnitus   No improvements with medication or OTC flonase or PO allergy medication over the years   Pt considering looking for a research study since nothing has worked  3. Anxiety  -     busPIRone (BUSPAR) 15 MG tablet;  Take 15 mg by mouth in the morning and at bedtime, Disp-180 tablet, R-1Normal   Pt was unsure this was helpful but says when he skipped for a week he definitely noticed a difference so he restarted and says it is helping   Continue, discussed self care  4. Acute non-recurrent maxillary sinusitis  -     amoxicillin-clavulanate (AUGMENTIN) 875-125 MG per tablet; Take 1 tablet by mouth 2 times daily for 10 days, Disp-20 tablet, R-0Normal  -     benzonatate (TESSALON) 100 MG capsule; Take 1 capsule by mouth 3 times daily as needed for Cough, Disp-30 capsule, R-0Normal  Mucinex twice daily   Hot showers   Vicks vapo rub to chest, under nares   Humidifier or vaporizer near bed where sleeping   Sinus rinse as needed/Netti pot   Push fluids, increase protein intake   Cough and deep breathe   OTC (over the counter) Cough suppressant at night to aid with sleep- robitussin, delsym   If breathing issues, cough and deep breathe, lay on belly (prone) to aid with oxygenating lungs if possibly COVID- low concern, symptoms present x 2 weeks   Vitamin C, zinc, and vitamin D supplements to boost immune system   Cepacol/throat lozenges for sore throat and cough   Warm tea/tea with honey for cough and sore throat   Warm water or salt water gargle for sore throat   If given antibiotic, take in its entirety until completion to decrease antibiotic resistance developing   Tylenol and ibuprofen for aches and pains and fever >100 F   Take medication in its entirety even if feeling better to avoid a resistance to antibiotics   Recommend a probiotic while on antibiotic, do not take at same time of day as antibiotic as it can decrease efficacy of antibiotic. Probiotics can help to decrease GI side effects of antibiotics. You can also eat a yogurt while on antibiotics to help decrease GI side effects and help keep the healthy gut bacteria present while on antibiotics.  Probiotics can be purchased Over the counter in various forms- pills, powder, gummies, etc.   5. Acute cough  -     benzonatate (TESSALON) 100 MG capsule; Take 1 capsule by mouth 3 times daily as needed for Cough, Disp-30 capsule, R-0Normal    Cough drops, humidifier, vaporizer, hot showers    Care Gaps Addressed  TDAP vaccine recommended- call insurance to discuss coverage  COVID booster recommended  Colon cancer screening discussed- was scheduled for last week and now needs to reschedule  PHQ UTD 2023      I have reviewed patient's pertinent medical history, relevant laboratory and imaging studies, and past/future health maintenance. Discussed with the patient the importance of adhering to their current medication regimen as directed. Advised the patient that they should continue to work on eating a healthy balanced diet and staying active by exercising within their personal limits. Orders as listed above. Patient was advised to keep future appointments with their respective specialty care team(s). Patient had the opportunity to ask questions, all of which were answered to the best of my ability and with patient satisfaction. Patient understands and is agreeable with the care plan following today's visit. Patient is to schedule an appointment for any new or worsening symptoms. Go to ER for significant shortness of breath, chest pain, or uncontrolled pain or fever. I discussed with patient the risk and benefits of any medications that were prescribed today. I verified that the patient understands their medications, labs, and/or procedures. The patient is doing well with current medication regimen and does not have any barriers to adherence. The patient's self-management abilities are good. Return in about 6 months (around 8/15/2023) for Physical Exam and Fasting Labs, HTN Follow Up. An electronic signature was used to authenticate this note.     --Randa Smith, WHITNEY - CNP on 2/15/2023 at 8:03 AM

## 2023-03-13 ENCOUNTER — TELEPHONE (OUTPATIENT)
Dept: FAMILY MEDICINE CLINIC | Age: 48
End: 2023-03-13

## 2023-03-13 DIAGNOSIS — U07.1 COVID-19: Primary | ICD-10-CM

## 2023-03-13 NOTE — TELEPHONE ENCOUNTER
PT TESTED POSITIVE TODAY FOR COVID -- SX STARTED YESTERDAY AFTERNOON  -  SORE THROAT, CONGESTION, NO FEVER.  CHILLS, BODY ACHES,     PT @  [phone]    [facility] #[id number] - [address] [facility] [phone]

## 2023-03-13 NOTE — TELEPHONE ENCOUNTER
Patient want us to send the medication to [address] [place] [address] [facility] [address] [facility].

## 2023-05-17 ENCOUNTER — PATIENT MESSAGE (OUTPATIENT)
Dept: FAMILY MEDICINE CLINIC | Age: 48
End: 2023-05-17

## 2023-05-17 DIAGNOSIS — M65.352 TRIGGER LITTLE FINGER OF LEFT HAND: Primary | ICD-10-CM

## 2023-05-17 NOTE — TELEPHONE ENCOUNTER
From: [facility]  To: Gillian Zendejas  Sent: 5/17/2023 10:44 AM EDT  Subject: Hand/Finger pain    Bhumika. .. Dreayoanna Buck over the last month or so I am having an issue with my left hand pinky finger. Essentially it will \"lock\" into place, both open or with fingers closed. I have to force my pinky finger closed to make a fist or grab onto something. Once closed it will lock into place and I will have to force it back open. This is also now causing quite a bit of pain in my hand. Ist here someone (Hand Specialist/Orthopedic), that you could refer me to, or should I start with an appointment with you and go from there?     Thank You,  Chip

## 2023-05-22 ENCOUNTER — PATIENT MESSAGE (OUTPATIENT)
Dept: FAMILY MEDICINE CLINIC | Age: 48
End: 2023-05-22

## 2023-05-22 ENCOUNTER — E-VISIT (OUTPATIENT)
Dept: FAMILY MEDICINE CLINIC | Age: 48
End: 2023-05-22
Payer: COMMERCIAL

## 2023-05-22 DIAGNOSIS — J01.00 ACUTE NON-RECURRENT MAXILLARY SINUSITIS: ICD-10-CM

## 2023-05-22 PROCEDURE — 99422 OL DIG E/M SVC 11-20 MIN: CPT | Performed by: NURSE PRACTITIONER

## 2023-05-22 RX ORDER — AMOXICILLIN AND CLAVULANATE POTASSIUM 875; 125 MG/1; MG/1
1 TABLET, FILM COATED ORAL 2 TIMES DAILY
Qty: 14 TABLET | Refills: 0 | Status: SHIPPED | OUTPATIENT
Start: 2023-05-22 | End: 2023-05-29

## 2023-05-22 ASSESSMENT — LIFESTYLE VARIABLES
SMOKING_STATUS: NO, I'M A FORMER SMOKER
PACKS_PER_DAY: 0
SMOKING_YEARS: 10

## 2023-05-22 NOTE — TELEPHONE ENCOUNTER
From: [facility]  To: Michelet Herzog  Sent: 5/22/2023 7:42 AM EDT  Subject: Cough/Sore Throat    I have had a bad cough and sore thorat since Friday afternoon. Milky Green snot. Aches on Friday and Saturday, they have subsided. Been taking Mucinex and cold/sinus medication. Anything else I should do? Should I get on antibiotic or need to see you? Sorry to be a pain. And sorry to hear you are leaving.

## 2023-05-22 NOTE — PROGRESS NOTES
Patient completed evisit for sinus issues, nasal congestion, mostly thin yellow/green, pain in teeth/pain, sore throat, sneezing and coughing in spasms morning and night. Some mucus thin/yellow/green. Ears feel OK. Voice is hoarse, some body aches. No fevers. Symptoms present about 3 days. No improvement with symptoms despite mucinex, cold and sinus meds dayquil and nyquil. Requesting antibiotics. Reviewed allergies. 1. Acute non-recurrent maxillary sinusitis  -     amoxicillin-clavulanate (AUGMENTIN) 875-125 MG per tablet; Take 1 tablet by mouth 2 times daily for 7 days WATCH AND WAIT, RECOMMEND STARTING AFTER 7 DAYS OF SYMPTOMS, Disp-14 tablet, R-0Normal  Mucinex twice daily   Hot showers   Vicks vapo rub to chest, under nares   Humidifier or vaporizer near bed where sleeping   Sinus rinse as needed/Netti pot   Push fluids, increase protein intake   Cough and deep breathe   OTC (over the counter) Cough suppressant at night to aid with sleep- robitussin, delsym   If breathing issues, cough and deep breathe, lay on belly (prone) to aid with oxygenating lungs if possibly COVID   Vitamin C, zinc, and vitamin D supplements to boost immune system   Cepacol/throat lozenges for sore throat and cough   Warm tea/tea with honey for cough and sore throat   Warm water or salt water gargle for sore throat   If given antibiotic, take in its entirety until completion to decrease antibiotic resistance developing   Tylenol and ibuprofen for aches and pains and fever >100 F   Watch and wait antibiotic given and MyChart message sent with instructions of this and what to do until 7-10 days of symptoms   Take medication in its entirety even if feeling better to avoid a resistance to antibiotics   Recommend a probiotic while on antibiotic, do not take at same time of day as antibiotic as it can decrease efficacy of antibiotic. Probiotics can help to decrease GI side effects of antibiotics.  You can also eat a yogurt while on

## 2023-05-26 ENCOUNTER — OFFICE VISIT (OUTPATIENT)
Dept: ORTHOPEDIC SURGERY | Age: 48
End: 2023-05-26

## 2023-05-26 VITALS — HEIGHT: 75 IN | WEIGHT: 293 LBS | BODY MASS INDEX: 36.43 KG/M2 | RESPIRATION RATE: 16 BRPM

## 2023-05-26 DIAGNOSIS — M65.30 TRIGGER FINGER, ACQUIRED: Primary | ICD-10-CM

## 2023-05-26 RX ORDER — TRIAMCINOLONE ACETONIDE 40 MG/ML
20 INJECTION, SUSPENSION INTRA-ARTICULAR; INTRAMUSCULAR ONCE
Status: COMPLETED | OUTPATIENT
Start: 2023-05-26 | End: 2023-05-26

## 2023-05-26 RX ADMIN — TRIAMCINOLONE ACETONIDE 20 MG: 40 INJECTION, SUSPENSION INTRA-ARTICULAR; INTRAMUSCULAR at 09:35

## 2023-05-26 NOTE — PATIENT INSTRUCTIONS
Information & Instructions   After Finger, Hand, Wrist, or Elbow Injection    Ingrid Paulino MD    You have received an injection of local anesthetic (Bupivicaine without Epinephrine) for comfort & a steroid (Kenalog) for its strong anti-inflammatory effects. In order to give the medication a chance to reduce your inflammation and discomfort, it is recommended that you take it easy for a day or so. You may use your hand and arm as you feel comfortable, but you should avoid highly strenuous activity and heavy use for several days. Relief from the injection will often not begin for several days, and you may not feel full relief for up to one month. It is not uncommon to experience some local discomfort or pain at or around the injection site for a few days. To relieve these symptoms you may do the following if you feel necessary:       Apply ice to the affected area 20 minutes on and 20 minutes off. Do not apply ice directly to the skin. Use a thin layer (T-shirt, pillowcase, towel, etc.) to protect the skin. - If allowed by your other medical physicians, you may take -     2 Tylenol extra strength tablets every 4-6 hours       1-2 Aleve tablets twice a day     2-3 Advil tablets two to three times a day    If you are diabetic, the steroid medication may increase your blood sugar, so you are advised to monitor your sugar more closely so you can adjust it accordingly for a few days following your injection. If you need assistance with the control of you blood sugar, please contact you primary care physician for further advice. I will request that you please call the office one month after your injection at [phone] if you have not experienced relief of your symptoms (unless I have instructed you otherwise). If your injection has given you good relief of you symptoms as expected, then you only need to call the office if your symptoms return.

## 2023-05-26 NOTE — PROGRESS NOTES
Mr. Nieves Townsend is a 41 y.o. right handed man  who is seen today in Hand Surgical Consultation at the request of WHITNEY Up CNP. He presents today regarding left symptoms which have been present for approximately 6 months. A history of antecedent trauma or injury is Absent. He reports symptoms to include mild pain and stiffness with frequent popping, catching or locking of the left Small Finger. Finger symptoms are Frequently worse in the morning or overnight. He reports mild pain located at the base of the symptomatic finger(s). Symptoms are worsening over time. Previous treatment has included conservative measures. He does not claim relation of his symptoms to his required work activities. He has not undergone any form of testing. I have today reviewed with Nieves Townsend the clinically relevant, past medical history, medications, allergies,  family history, social history, and Review Of Systems & I have documented any details relevant to today's presenting complaints in my history above. Mr. Leslie Failing self-reported past medical history, medications, allergies,  family history, social history, and Review Of Systems have been scanned into the chart under the \"Media\" tab. Physical Exam:  Mr. Leslie Failing most recent vitals:  Vitals  Respirations: 16  Height: 6' 3\" (190.5 cm)  Weight - Scale: 293 lb (132.9 kg)    He is well nourished, oriented to person, place & time. He demonstrates appropriate mood and affect as well as normal gait and station. Skin: Normal in appearance, Normal Color, and Free of Lesions Bilaterally   Digital range of motion is without significant limitation bilaterally. Active triggering is noted upon flexion in the left Small Finger. There is not an associated flexion contracture of the PIP joint. No other digit demonstrates evidence for stenosing tenosynovitis bilaterally.   Wrist range of motion is without significant

## 2023-05-26 NOTE — PROGRESS NOTES
Administrations This Visit       triamcinolone acetonide (KENALOG-40) injection 20 mg       Admin Date  05/26/2023  09:35 Action  Given Dose  20 mg Route  Other Site  Finger (See Comments) Administered By  Nitesh Greer MA    Ordering Provider: CESAR [address]: [id number]    Lot#: [id number]    : [employer] (PRIMARY CARE)    Patient Supplied?: No    Comments: Left Small

## 2023-06-07 ENCOUNTER — TELEPHONE (OUTPATIENT)
Dept: FAMILY MEDICINE CLINIC | Age: 48
End: 2023-06-07

## 2023-06-07 ENCOUNTER — PATIENT MESSAGE (OUTPATIENT)
Dept: FAMILY MEDICINE CLINIC | Age: 48
End: 2023-06-07

## 2023-06-07 DIAGNOSIS — J01.00 ACUTE NON-RECURRENT MAXILLARY SINUSITIS: Primary | ICD-10-CM

## 2023-06-07 NOTE — TELEPHONE ENCOUNTER
PT LAST SEEN 05/22/23  -  THE GREENISH/YELLOWISH DRAINAGE IS GONE - DRAINAGE IS NOW CLEAR - STILL COUGHING - GETS WORSE AT NIGHT AND IN THE EARLY MORNINGS - DO YOU WANT TO CALL IN ANOTHER ANTIBIOTIC?     PT @  [address] [facility], [address] [place], [place] [address]Th

## 2023-06-08 RX ORDER — METHYLPREDNISOLONE 4 MG/1
TABLET ORAL
Qty: 1 KIT | Refills: 0 | Status: SHIPPED | OUTPATIENT
Start: 2023-06-08 | End: 2023-06-14

## 2023-06-08 NOTE — TELEPHONE ENCOUNTER
From: Marry Aly  To: Celine Ferrari  Sent: 6/7/2023 7:58 PM EDT  Subject: Follow up    I called this morning and was told they would send you a message about my continuing symptoms of cough and sore throat from end of May. Did you get the message? I see nothing on My Chart    I am coughing as much if not more than before and continue to have a sore throat. My phlem is white and or clear and coughing is much worse in the morning and evening. occasionally my cough will bring something up, but often time it does not. What can I do, this is driving me crazy and I dont feel I am getting much better.

## 2023-07-19 DIAGNOSIS — I10 ESSENTIAL HYPERTENSION: ICD-10-CM

## 2023-07-19 RX ORDER — LOSARTAN POTASSIUM 100 MG/1
TABLET ORAL
Qty: 90 TABLET | Refills: 0 | Status: SHIPPED | OUTPATIENT
Start: 2023-07-19

## 2023-09-18 ENCOUNTER — OFFICE VISIT (OUTPATIENT)
Dept: FAMILY MEDICINE CLINIC | Age: 48
End: 2023-09-18
Payer: COMMERCIAL

## 2023-09-18 VITALS
HEIGHT: 75 IN | WEIGHT: 294 LBS | OXYGEN SATURATION: 95 % | HEART RATE: 84 BPM | BODY MASS INDEX: 36.56 KG/M2 | DIASTOLIC BLOOD PRESSURE: 80 MMHG | SYSTOLIC BLOOD PRESSURE: 130 MMHG

## 2023-09-18 DIAGNOSIS — R73.01 IFG (IMPAIRED FASTING GLUCOSE): ICD-10-CM

## 2023-09-18 DIAGNOSIS — Z12.11 SCREEN FOR COLON CANCER: ICD-10-CM

## 2023-09-18 DIAGNOSIS — Z00.01 ENCOUNTER FOR WELL ADULT EXAM WITH ABNORMAL FINDINGS: Primary | ICD-10-CM

## 2023-09-18 DIAGNOSIS — E78.00 PURE HYPERCHOLESTEROLEMIA: ICD-10-CM

## 2023-09-18 DIAGNOSIS — H93.12 LEFT-SIDED TINNITUS: ICD-10-CM

## 2023-09-18 DIAGNOSIS — G44.019 EPISODIC CLUSTER HEADACHE, NOT INTRACTABLE: ICD-10-CM

## 2023-09-18 DIAGNOSIS — E66.01 SEVERE OBESITY (BMI 35.0-39.9) WITH COMORBIDITY (HCC): ICD-10-CM

## 2023-09-18 DIAGNOSIS — Z23 NEEDS FLU SHOT: ICD-10-CM

## 2023-09-18 DIAGNOSIS — I10 ESSENTIAL HYPERTENSION: ICD-10-CM

## 2023-09-18 LAB
ALBUMIN SERPL-MCNC: 4.7 G/DL (ref 3.4–5)
ALBUMIN/GLOB SERPL: 2.1 {RATIO} (ref 1.1–2.2)
ALP SERPL-CCNC: 73 U/L (ref 40–129)
ALT SERPL-CCNC: 54 U/L (ref 10–40)
ANION GAP SERPL CALCULATED.3IONS-SCNC: 9 MMOL/L (ref 3–16)
AST SERPL-CCNC: 27 U/L (ref 15–37)
BILIRUB SERPL-MCNC: 0.4 MG/DL (ref 0–1)
BUN SERPL-MCNC: 19 MG/DL (ref 7–20)
CALCIUM SERPL-MCNC: 9.9 MG/DL (ref 8.3–10.6)
CHLORIDE SERPL-SCNC: 106 MMOL/L (ref 99–110)
CHOLEST SERPL-MCNC: 209 MG/DL (ref 0–199)
CO2 SERPL-SCNC: 28 MMOL/L (ref 21–32)
CREAT SERPL-MCNC: 0.9 MG/DL (ref 0.9–1.3)
EST. AVERAGE GLUCOSE BLD GHB EST-MCNC: 111.2 MG/DL
GFR SERPLBLD CREATININE-BSD FMLA CKD-EPI: >60 ML/MIN/{1.73_M2}
GLUCOSE SERPL-MCNC: 111 MG/DL (ref 70–99)
HBA1C MFR BLD: 5.5 %
HDLC SERPL-MCNC: 67 MG/DL (ref 40–60)
LDLC SERPL CALC-MCNC: 112 MG/DL
POTASSIUM SERPL-SCNC: 4.9 MMOL/L (ref 3.5–5.1)
PROT SERPL-MCNC: 6.9 G/DL (ref 6.4–8.2)
SODIUM SERPL-SCNC: 143 MMOL/L (ref 136–145)
TRIGL SERPL-MCNC: 152 MG/DL (ref 0–150)
VLDLC SERPL CALC-MCNC: 30 MG/DL

## 2023-09-18 PROCEDURE — 90674 CCIIV4 VAC NO PRSV 0.5 ML IM: CPT | Performed by: NURSE PRACTITIONER

## 2023-09-18 PROCEDURE — 99396 PREV VISIT EST AGE 40-64: CPT | Performed by: NURSE PRACTITIONER

## 2023-09-18 PROCEDURE — 90471 IMMUNIZATION ADMIN: CPT | Performed by: NURSE PRACTITIONER

## 2023-09-18 PROCEDURE — 3079F DIAST BP 80-89 MM HG: CPT | Performed by: NURSE PRACTITIONER

## 2023-09-18 PROCEDURE — 3075F SYST BP GE 130 - 139MM HG: CPT | Performed by: NURSE PRACTITIONER

## 2023-09-18 PROCEDURE — 36415 COLL VENOUS BLD VENIPUNCTURE: CPT | Performed by: NURSE PRACTITIONER

## 2023-09-18 RX ORDER — LOSARTAN POTASSIUM 100 MG/1
TABLET ORAL
Qty: 90 TABLET | Refills: 3 | Status: SHIPPED | OUTPATIENT
Start: 2023-09-18

## 2023-09-18 ASSESSMENT — ENCOUNTER SYMPTOMS
COUGH: 0
SINUS PRESSURE: 0
EYE DISCHARGE: 0
EYE PAIN: 0
SINUS PAIN: 0
SHORTNESS OF BREATH: 0
WHEEZING: 0
EYE REDNESS: 0
VOMITING: 0
ABDOMINAL PAIN: 0
SORE THROAT: 0
DIARRHEA: 0
ABDOMINAL DISTENTION: 0
NAUSEA: 0

## 2023-09-18 NOTE — PROGRESS NOTES
Well Adult Note  Name: Fartun Chan Date: 2023   MRN: [id number] Sex: Male   Age: 57 y.o. Ethnicity: Non- / Non    : 1975 Race: White (non-)      Sahra Rivera is here for well adult exam.  History:  Carlos presents today for his annual physical.  Since last being seen, he continues to struggle cluster headaches and left-sided tinnitus. He has followed with ENT in the past.  Had hearing testing which was unremarkable. He states that he is frustrated with the lack of answers. Inquiring if any other ideas can be brought forward to try to help. Has tried allergy regimens and Flonase without alleviation. Does have a history of sinus surgery which did not help either. Has had the tinnitus for about a year. Correlated with a cluster headache. He has not had imaging of his brain. At 1 point ENT recommended neurology and MRI but he declined this. Starting to state that he would prefer to have some additional evaluation. The patient continues to take losartan for blood pressure. Had been prescribed buspirone for anxiety but states that this was making him dizzy. Does not have a regular exercise. Works in sales. Has been trying to limit fast food. Trying to closely monitor what he eats. Has a 5year-old and a teenager who is a freshman at [employer]. Attended lots of school events have a busy lifestyle. He is agreeable to colonoscopy referral.  Had previously tried to schedule colonoscopy twice but became ill prior to both procedures. Would like to get his flu shot today. Review of Systems   Constitutional:  Negative for chills and fever. HENT:  Positive for tinnitus. Negative for ear discharge, ear pain, hearing loss, sinus pressure, sinus pain and sore throat. Eyes:  Negative for pain, discharge and redness. Respiratory:  Negative for cough, shortness of breath and wheezing. Cardiovascular:  Negative for chest pain and palpitations.

## 2024-02-26 ENCOUNTER — E-VISIT (OUTPATIENT)
Dept: FAMILY MEDICINE CLINIC | Age: 49
End: 2024-02-26
Payer: COMMERCIAL

## 2024-02-26 DIAGNOSIS — B96.89 ACUTE BACTERIAL SINUSITIS: Primary | ICD-10-CM

## 2024-02-26 DIAGNOSIS — J01.90 ACUTE BACTERIAL SINUSITIS: Primary | ICD-10-CM

## 2024-02-26 PROCEDURE — 99422 OL DIG E/M SVC 11-20 MIN: CPT | Performed by: NURSE PRACTITIONER

## 2024-02-26 RX ORDER — AMOXICILLIN AND CLAVULANATE POTASSIUM 875; 125 MG/1; MG/1
1 TABLET, FILM COATED ORAL 2 TIMES DAILY
Qty: 14 TABLET | Refills: 0 | Status: SHIPPED | OUTPATIENT
Start: 2024-02-26 | End: 2024-03-04

## 2024-02-26 RX ORDER — METHYLPREDNISOLONE 4 MG/1
TABLET ORAL
Qty: 1 KIT | Refills: 0 | Status: SHIPPED | OUTPATIENT
Start: 2024-02-26 | End: 2024-03-03

## 2024-02-26 ASSESSMENT — LIFESTYLE VARIABLES
SMOKING_YEARS: 15
PACKS_PER_DAY: 0
SMOKING_STATUS: NO, I'M A FORMER SMOKER

## 2024-02-26 NOTE — PROGRESS NOTES
11-20 minutes were dedicated the visit.  This includes review questionnaire, review of chart, prescription medication, messaging the patient.

## 2024-04-19 ENCOUNTER — OFFICE VISIT (OUTPATIENT)
Dept: ORTHOPEDIC SURGERY | Age: 49
End: 2024-04-19

## 2024-04-19 VITALS — WEIGHT: 294 LBS | HEIGHT: 75 IN | BODY MASS INDEX: 36.56 KG/M2 | RESPIRATION RATE: 16 BRPM

## 2024-04-19 DIAGNOSIS — M65.30 TRIGGER FINGER, ACQUIRED: Primary | ICD-10-CM

## 2024-04-19 RX ORDER — TRIAMCINOLONE ACETONIDE 40 MG/ML
20 INJECTION, SUSPENSION INTRA-ARTICULAR; INTRAMUSCULAR ONCE
Status: COMPLETED | OUTPATIENT
Start: 2024-04-19 | End: 2024-04-19

## 2024-04-19 RX ADMIN — TRIAMCINOLONE ACETONIDE 20 MG: 40 INJECTION, SUSPENSION INTRA-ARTICULAR; INTRAMUSCULAR at 09:19

## 2024-04-19 NOTE — PROGRESS NOTES
Administrations This Visit       triamcinolone acetonide (KENALOG-40) injection 20 mg       Admin Date  04/19/2024  09:19 Action  Given Dose  20 mg Route  Intra-artICUlar Site  Finger (See Comments) Administered By  Cathy Todd MA    Ordering Provider: Jennifer Wray PA-C    Patient Supplied?: No    Comments: Left Small

## 2024-04-19 NOTE — PROGRESS NOTES
Mr. Hai Cedeno returns today in follow-up of his previously treated  left Small Finger stenosing tenosynovitis.  He was last seen by me in May, 2023 at which time he was treated with Steroid injection to the Left, Small Finger, Flexor Tendon Sheath.  He experienced complete relief of his initial symptoms.  He  has noticed symptom worsening over the last several months.  He returns today with worsened symptoms of left Small Finger pain and swelling, requesting further treatment.  Due to the dynamic and progressive nature of Stenosing Tenosynovitis, It is clinically necessary to carefully re-evaluate Mr. Hai Cedeno today, prior to proceeding with any further treatment or intervention, to assure the clinical appropriateness of any previously considered treatment, at this time.      I have today reviewed with Hai Cedeno the clinically relevant, past medical history, medications, allergies,  family history, social history, and Review Of Systems & I have documented any details relevant to today's presenting complaints in my history above.  Mr. Hai Cedeno's self-reported past medical history, medications, allergies,  family history, social history, and Review Of Systems have been scanned into the chart under the \"Media\" tab.    Physical Exam:  Vitals  Respirations: 16  Height: 190.5 cm (6' 3\")  Weight - Scale: 133.4 kg (294 lb)  Mr. Hai Cedeno appears well, he is in no apparent distress, he demonstrates appropriate mood & affect.      Skin: Normal in appearance, Normal Color, and Free of Lesions Bilaterally   Digital range of motion is limited by pain on the Left, normal on the Right.  Inducible triggering is noted upon flexion in the left Small Finger.  There is not an associated flexion contracture of the PIP joint.  No other digit demonstrates evidence for stenosing tenosynovitis bilaterally.  Wrist range of motion is Full bilaterally  There is no evidence of gross joint instability

## 2024-08-21 NOTE — TELEPHONE ENCOUNTER
PT CALLED IN HE IS ON BP MEDICATION HE STATED HE FEELS LIKE HIS BP IS HIGH HE HAS BEEN CHECKING AT HOME NOT SURE HOW ACCURATE THOSE ARE PT [facility] PT HAS DEVELOPED RINGING IN HIS EARS PT CANNOT REMEMBER THE NAME OF THE MEDICATION DR FERNÁNDEZ PUT HIM ON       PLEASE ADVISE WHAT PT SHOULD DO?
lvm for him to call back  He should be taking Verapamil 120 mg bid  Per Dr Carreon, add on at eod
[id number]

## 2024-10-26 DIAGNOSIS — I10 ESSENTIAL HYPERTENSION: ICD-10-CM

## 2024-10-28 RX ORDER — LOSARTAN POTASSIUM 100 MG/1
TABLET ORAL
Qty: 90 TABLET | Refills: 0 | Status: SHIPPED | OUTPATIENT
Start: 2024-10-28

## 2024-12-30 ENCOUNTER — E-VISIT (OUTPATIENT)
Dept: FAMILY MEDICINE CLINIC | Age: 49
End: 2024-12-30

## 2024-12-30 DIAGNOSIS — B96.89 ACUTE BACTERIAL SINUSITIS: Primary | ICD-10-CM

## 2024-12-30 DIAGNOSIS — J01.90 ACUTE BACTERIAL SINUSITIS: Primary | ICD-10-CM

## 2024-12-30 PROCEDURE — 99422 OL DIG E/M SVC 11-20 MIN: CPT

## 2024-12-30 ASSESSMENT — LIFESTYLE VARIABLES
SMOKING_YEARS: 10
PACKS_PER_DAY: 0
SMOKING_STATUS: NO, I'M A FORMER SMOKER

## 2024-12-31 RX ORDER — AZITHROMYCIN 250 MG/1
TABLET, FILM COATED ORAL
Qty: 6 TABLET | Refills: 0 | Status: SHIPPED | OUTPATIENT
Start: 2024-12-31 | End: 2025-01-10

## 2024-12-31 NOTE — PROGRESS NOTES
1. Acute bacterial sinusitis  -Concerning for acute bacterial sinusitis  -Treatment options discussed.  Z-Aquilino is being sent to the pharmacy.   The medication uses and side effects were discussed with the patient.  Patient verbalized understanding and agrees to the plan.  -Emphasis of rest and hydration  -Can use OTCs to limit symptoms  -Follow-up if no improvement  - azithromycin (ZITHROMAX) 250 MG tablet; 500mg on day 1 followed by 250mg on days 2 - 5  Dispense: 6 tablet; Refill: 0      --Luke A Glischinski, APRN - CNP      Please note that this chart was generated using dragon dictation software.  Although every effort was made to ensure the accuracy of this automated transcription, some errors in transcription may have occurred.      A total of 11-20 minutes were spent on the entirety of this E-visit.

## 2025-01-22 ENCOUNTER — OFFICE VISIT (OUTPATIENT)
Dept: FAMILY MEDICINE CLINIC | Age: 50
End: 2025-01-22
Payer: COMMERCIAL

## 2025-01-22 VITALS
DIASTOLIC BLOOD PRESSURE: 88 MMHG | HEART RATE: 97 BPM | WEIGHT: 301 LBS | BODY MASS INDEX: 37.42 KG/M2 | SYSTOLIC BLOOD PRESSURE: 132 MMHG | HEIGHT: 75 IN | OXYGEN SATURATION: 97 %

## 2025-01-22 DIAGNOSIS — B96.89 ACUTE BACTERIAL SINUSITIS: Primary | ICD-10-CM

## 2025-01-22 DIAGNOSIS — J01.90 ACUTE BACTERIAL SINUSITIS: Primary | ICD-10-CM

## 2025-01-22 DIAGNOSIS — F51.04 PSYCHOPHYSIOLOGIC INSOMNIA: ICD-10-CM

## 2025-01-22 DIAGNOSIS — I10 ESSENTIAL HYPERTENSION: ICD-10-CM

## 2025-01-22 PROCEDURE — 3079F DIAST BP 80-89 MM HG: CPT | Performed by: NURSE PRACTITIONER

## 2025-01-22 PROCEDURE — 3075F SYST BP GE 130 - 139MM HG: CPT | Performed by: NURSE PRACTITIONER

## 2025-01-22 PROCEDURE — 99214 OFFICE O/P EST MOD 30 MIN: CPT | Performed by: NURSE PRACTITIONER

## 2025-01-22 RX ORDER — HYDROXYZINE HYDROCHLORIDE 50 MG/1
25-50 TABLET, FILM COATED ORAL NIGHTLY
Qty: 30 TABLET | Refills: 0 | Status: SHIPPED | OUTPATIENT
Start: 2025-01-22 | End: 2025-02-21

## 2025-01-22 RX ORDER — METHYLPREDNISOLONE 4 MG/1
TABLET ORAL
Qty: 1 KIT | Refills: 0 | Status: SHIPPED | OUTPATIENT
Start: 2025-01-22 | End: 2025-01-28

## 2025-01-22 RX ORDER — LOSARTAN POTASSIUM 100 MG/1
TABLET ORAL
Qty: 90 TABLET | Refills: 1 | Status: SHIPPED | OUTPATIENT
Start: 2025-01-22

## 2025-01-22 SDOH — ECONOMIC STABILITY: FOOD INSECURITY: WITHIN THE PAST 12 MONTHS, THE FOOD YOU BOUGHT JUST DIDN'T LAST AND YOU DIDN'T HAVE MONEY TO GET MORE.: NEVER TRUE

## 2025-01-22 SDOH — ECONOMIC STABILITY: FOOD INSECURITY: WITHIN THE PAST 12 MONTHS, YOU WORRIED THAT YOUR FOOD WOULD RUN OUT BEFORE YOU GOT MONEY TO BUY MORE.: NEVER TRUE

## 2025-01-22 ASSESSMENT — ENCOUNTER SYMPTOMS
ABDOMINAL DISTENTION: 0
EYE REDNESS: 0
SINUS PAIN: 1
NAUSEA: 0
VOMITING: 0
SINUS PRESSURE: 1
EYE PAIN: 0
SHORTNESS OF BREATH: 0
DIARRHEA: 0
ABDOMINAL PAIN: 0
COUGH: 1
EYE DISCHARGE: 0
WHEEZING: 0
SORE THROAT: 0
RHINORRHEA: 1

## 2025-01-22 ASSESSMENT — PATIENT HEALTH QUESTIONNAIRE - PHQ9
SUM OF ALL RESPONSES TO PHQ QUESTIONS 1-9: 0
SUM OF ALL RESPONSES TO PHQ9 QUESTIONS 1 & 2: 0
2. FEELING DOWN, DEPRESSED OR HOPELESS: NOT AT ALL
SUM OF ALL RESPONSES TO PHQ QUESTIONS 1-9: 0
SUM OF ALL RESPONSES TO PHQ QUESTIONS 1-9: 0
DEPRESSION UNABLE TO ASSESS: URGENT/EMERGENT SITUATION
SUM OF ALL RESPONSES TO PHQ QUESTIONS 1-9: 0
1. LITTLE INTEREST OR PLEASURE IN DOING THINGS: NOT AT ALL

## 2025-01-22 NOTE — ASSESSMENT & PLAN NOTE
Controlled.  Continue losartan.  Follow-up for annual physical/fasting labs.      Orders:    losartan (COZAAR) 100 MG tablet; TAKE 1 TABLET BY MOUTH EVERY DAY FOR BLOOD PRESSURE

## 2025-01-22 NOTE — PROGRESS NOTES
Hai Cedeno (:  1975) is a 49 y.o. male,Established patient, here for evaluation of the following chief complaint(s):  Cough (COUGH X 1 MONTH )      ASSESSMENT/PLAN:  Assessment & Plan  Acute bacterial sinusitis    The patient completed antibiotic but is having residual sinusitis, signs of eustachian tube dysfunction, and cough.  Will treat with Medrol Dosepak.  Follow-up as needed.    Orders:    methylPREDNISolone (MEDROL DOSEPACK) 4 MG tablet; Take by mouth.    Essential hypertension    Controlled.  Continue losartan.  Follow-up for annual physical/fasting labs.      Orders:    losartan (COZAAR) 100 MG tablet; TAKE 1 TABLET BY MOUTH EVERY DAY FOR BLOOD PRESSURE    Psychophysiologic insomnia    Uncontrolled.  Start hydroxyzine.  Follow-up for annual physical/labs    Orders:    hydrOXYzine HCl (ATARAX) 50 MG tablet; Take 0.5-1 tablets by mouth nightly         Return if symptoms worsen or fail to improve.    SUBJECTIVE/OBJECTIVE:  HPI  Chip presents today regarding continued sinus symptoms, sleep issues, and need for a refill of blood pressure medication:    -Recently did an e-visit and was treated with antibiotics for bacterial sinusitis.  States that initially he felt better but then went down for a work trip to [place].  When he came back he redeveloped congestion, cough, and ear discomfort.  Denies any fevers.  Contacted the office regarding a possible additional treatment and was requested to be evaluated.    -Has been having some trouble with sleep even before becoming ill.  States that his mind races and he has trouble shutting it off.  Has tried melatonin as well as over-the-counter sleep aid with some success but still has issues.  Inquiring about additional recommendations.    -Continues to take losartan for hypertension.  Does plan on scheduling a physical with fasting labs but would like a refill as he is due.  Has tolerated the medication without issue.        Review of Systems

## 2025-02-13 DIAGNOSIS — F51.04 PSYCHOPHYSIOLOGIC INSOMNIA: ICD-10-CM

## 2025-02-13 RX ORDER — HYDROXYZINE HYDROCHLORIDE 50 MG/1
25-50 TABLET, FILM COATED ORAL NIGHTLY
Qty: 90 TABLET | Refills: 1 | Status: SHIPPED | OUTPATIENT
Start: 2025-02-13 | End: 2025-08-12

## 2025-06-30 NOTE — PATIENT INSTRUCTIONS
even if current refills have been exhausted. If you are on a controlled medication you will be referred to a specialist (pain specialist, psychiatry, etc). Forms: There is a $35 fee to fill out FMLA/Disability paperwork, payable at time of . Instead of the fee, you can choose to have the paperwork filled out during a separate office visit that is for filling out the paperwork only. Medication Samples: This office does not carry medication samples. If you need assistance in getting your medications, then please let the medical assistant know so they can help you sign up for a drug assistance program that can help get medications at a reduced cost or even free (if you qualify). Workman's Comp Claims: We do not handle workman's comp cases or claims. You will need to go to an urgent care to be seen or to whomever your employer uses. General - Any abusive/rude behavior toward staff/providers may be cause for dismissal.        Starting a Weight Loss Plan: Care Instructions  Overview     If you're thinking about losing weight, it can be hard to know where to start. Your doctor can help you set up a weight loss plan that best meets your needs. You may want to take a class on nutrition or exercise, or you could join a weight loss support group. If you have questions about how to make changes to your eating or exercise habits, ask your doctor about seeing a registered dietitian or an exercise specialist.  It can be a big challenge to lose weight. But you don't have to make huge changes at once. Make small changes, and stick with them. When those changes become habit, add a few more changes. If you don't think you're ready to make changes right now, try to pick a date in the future. Make an appointment to see your doctor to discuss whether the time is right for you to start a plan. Follow-up care is a key part of your treatment and safety.  Be sure to make and go to all appointments, and call your doctor if you PAST MEDICAL HISTORY:  Asthma     HLD (hyperlipidemia)     HTN (hypertension)

## 2025-08-14 DIAGNOSIS — I10 ESSENTIAL HYPERTENSION: ICD-10-CM

## 2025-08-15 RX ORDER — LOSARTAN POTASSIUM 100 MG/1
TABLET ORAL
Qty: 30 TABLET | Refills: 0 | Status: SHIPPED | OUTPATIENT
Start: 2025-08-15

## 2025-09-03 DIAGNOSIS — F51.04 PSYCHOPHYSIOLOGIC INSOMNIA: ICD-10-CM

## 2025-09-03 RX ORDER — HYDROXYZINE HYDROCHLORIDE 50 MG/1
25-50 TABLET, FILM COATED ORAL NIGHTLY
Qty: 90 TABLET | Refills: 1 | Status: SHIPPED | OUTPATIENT
Start: 2025-09-03 | End: 2026-03-02